# Patient Record
Sex: FEMALE | Race: BLACK OR AFRICAN AMERICAN | NOT HISPANIC OR LATINO | URBAN - NONMETROPOLITAN AREA
[De-identification: names, ages, dates, MRNs, and addresses within clinical notes are randomized per-mention and may not be internally consistent; named-entity substitution may affect disease eponyms.]

---

## 2019-09-05 ENCOUNTER — TELEPHONE (OUTPATIENT)
Dept: OBSTETRICS AND GYNECOLOGY | Facility: CLINIC | Age: 29
End: 2019-09-05

## 2019-09-05 NOTE — TELEPHONE ENCOUNTER
PT CALLS REGARDING WHEN SHE HAD NEXPLANON INSERTED.  TOLD HER IT WAS INSERTED IN November 2016.  SHE WANTED TO KNOW HOW LONG IT WAS GOOD FOR AND DR. PEPE SAID IT WOULD BE GOOD FOR  FIVE YEARS.  PT INFORMED.  BC

## 2021-02-08 PROCEDURE — 87635 SARS-COV-2 COVID-19 AMP PRB: CPT | Performed by: NURSE PRACTITIONER

## 2021-07-07 ENCOUNTER — OFFICE VISIT (OUTPATIENT)
Dept: FAMILY MEDICINE CLINIC | Facility: CLINIC | Age: 31
End: 2021-07-07

## 2021-07-07 VITALS
TEMPERATURE: 98.3 F | SYSTOLIC BLOOD PRESSURE: 120 MMHG | HEART RATE: 76 BPM | WEIGHT: 152.2 LBS | HEIGHT: 61 IN | DIASTOLIC BLOOD PRESSURE: 70 MMHG | BODY MASS INDEX: 28.74 KG/M2 | OXYGEN SATURATION: 99 %

## 2021-07-07 DIAGNOSIS — R20.2 NUMBNESS AND TINGLING OF BOTH FEET: ICD-10-CM

## 2021-07-07 DIAGNOSIS — Z86.2 HISTORY OF ANEMIA: ICD-10-CM

## 2021-07-07 DIAGNOSIS — R53.83 FATIGUE, UNSPECIFIED TYPE: ICD-10-CM

## 2021-07-07 DIAGNOSIS — R20.0 NUMBNESS AND TINGLING OF BOTH FEET: ICD-10-CM

## 2021-07-07 DIAGNOSIS — F43.23 ADJUSTMENT DISORDER WITH MIXED ANXIETY AND DEPRESSED MOOD: Primary | ICD-10-CM

## 2021-07-07 DIAGNOSIS — R25.2 MUSCLE CRAMP, NOCTURNAL: ICD-10-CM

## 2021-07-07 PROCEDURE — 99204 OFFICE O/P NEW MOD 45 MIN: CPT | Performed by: FAMILY MEDICINE

## 2021-07-07 NOTE — PROGRESS NOTES
Chief Complaint  Establish Care (TINA-7=12 , PHQ-9=8) - moderate anxiety, mild depressive features    Subjective          Kimberly Nassar presents to [facility] - New  Pt.    History of Present Illness      Nexplanon user - has seen Keke Bailey at OB/GYN ( insertion was in Dec 2016) and also remotely seen Christian Anguiano for acne issues.  Doing regular exercise  Gets about 6 hours of sleep at night  Takes about 1 hour to fall asleep due to racing thoughts.  Would like to have a check up and some labs due to fatigue  + h/o anemia.    Reports history of mood changes while pregnant and postpartum 8 year ago.    Has had past trials of Prozac which worsened her sx and made her more agitated and somewhat aggressive.  Had a trial of Zoloft during the pregnancy and stays on it again for 6-7 months after the pregnancy due to post partum depression.      She is a non smoker who drinks occasionally and lives at home with her kids.  She has a college education and is up to date on vaccines.   She is employed with [employer]. Parenting and working are taking a toll on her and the fatigue is leading to irritability and strained interactions at home.    Review of Systems   Constitutional: Positive for fatigue.   Genitourinary: Negative for decreased libido.        Reports normal Paps in the past  Is interested in screening for STDs   Psychiatric/Behavioral: Positive for decreased concentration, sleep disturbance and depressed mood. The patient is nervous/anxious.         Has lots of worrying and feels she can't control it, has irritability and feels anxious, minimal agitation or restlessness.     Mild trouble with concentration.  No SI or thoughts of self-harm.            Past Medical History:   Diagnosis Date   • Anxiety    • Depression          Outpatient Medications Prior to Visit   Medication Sig Dispense Refill   • Etonogestrel (IMPLANON) 68 MG implant subdermal implant Inject 1 each into  "the skin 1 (one) time.       No facility-administered medications prior to visit.          Objective   Vital Signs:   /70 (BP Location: Left arm, Patient Position: Sitting, Cuff Size: Adult)   Pulse 76   Temp 98.3 °F (36.8 °C) (Temporal)   Ht 154.9 cm (61\")   Wt 69 kg (152 lb 3.2 oz)   SpO2 99%   BMI 28.76 kg/m²       Physical Exam  Vitals reviewed.   Constitutional:       Comments: Stocky habitus throughout   HENT:      Mouth/Throat:      Mouth: Mucous membranes are moist.      Pharynx: Oropharynx is clear. No oropharyngeal exudate.   Eyes:      Extraocular Movements: Extraocular movements intact.      Conjunctiva/sclera: Conjunctivae normal.      Comments: Lids normal   Neck:      Comments: Normal thyroid exam  Cardiovascular:      Rate and Rhythm: Normal rate and regular rhythm.   Pulmonary:      Effort: Pulmonary effort is normal.      Breath sounds: Normal breath sounds.   Abdominal:      General: Bowel sounds are normal. There is no distension.      Palpations: Abdomen is soft.      Tenderness: There is no abdominal tenderness.   Musculoskeletal:      Cervical back: Neck supple. No tenderness.      Right lower leg: No edema.      Left lower leg: No edema.   Lymphadenopathy:      Cervical: No cervical adenopathy.   Skin:     General: Skin is warm.      Findings: No erythema.   Neurological:      Mental Status: She is alert and oriented to person, place, and time.      Motor: No tremor.      Coordination: Coordination is intact.      Gait: Gait normal.   Psychiatric:         Attention and Perception: Attention normal.         Mood and Affect: Affect normal. Mood is anxious and depressed.         Speech: Speech normal.         Behavior: Behavior normal. Behavior is cooperative.         Thought Content: Thought content normal.         Cognition and Memory: Cognition and memory normal.         Judgment: Judgment normal.          Result Review :                Component   Ref Range & Units 5 yr ago "   (8/20/15) 5 yr ago   (7/15/15) 6 yr ago   (5/14/15) 6 yr ago   (4/17/15)   WBC   4.80 - 10.80 K/mcL 4.10Low   5.60  5.23  3.58Low     RBC   4.20 - 5.40 M/mcL 3.69Low   3.72Low   3.63Low   3.86Low     Hemoglobin   12.0 - 16.0 g/dL 11.4Low   11.5Low   11.3Low   11.9Low     Hematocrit   37.0 - 47.0 % 33.3Low   34.5Low   33.4Low   35.8Low     MCV   82.0 - 98.0 fL 90.2  92.7  92.0  92.7    MCH   28.0 - 32.0 pg 30.9  30.9  31.1  30.8    MCHC   33.0 - 36.0 gm/dL 34.2  33.3  33.8  33.2    RDW-SD   40.0 - 54.0 fL 45.2  46.9  43.4  43.4    RDW-CV   12.0 - 15.0 % 13.6  13.8  12.9  12.9    RDW   12 - 15 % 13.6  13.8  12.9  12.9            All chol panels normal for past 5-6 years  Normal Pap in 2016                  Assessment and Plan    Diagnoses and all orders for this visit:    1. Adjustment disorder with mixed anxiety and depressed mood (Primary)  -     TSH; Future  -     T4, free; Future  -     Comprehensive metabolic panel; Future  -     sertraline (Zoloft) 50 MG tablet; Take 1 tablet by mouth daily with food for mood.  Dispense: 30 tablet; Refill: 2    2. History of anemia  -     Iron and TIBC; Future  -     Vitamin B12; Future  -     CBC No Differential; Future    3. Numbness and tingling of both feet  -     TSH; Future  -     T4, free; Future  -     Vitamin B12; Future    4. Muscle cramp, nocturnal  -     TSH; Future  -     T4, free; Future  -     Comprehensive metabolic panel; Future  -     Vitamin D 25 hydroxy; Future  -     Magnesium; Future    5. Fatigue, unspecified type  -     TSH; Future  -     T4, free; Future  -     Comprehensive metabolic panel; Future  -     Iron and TIBC; Future  -     Vitamin B12; Future  -     Vitamin D 25 hydroxy; Future  -     Magnesium; Future  -     CBC No Differential; Future            Follow Up   Return in about 4 weeks (around 8/4/2021) for Recheck mood, lab results, Zoloft.    Patient was given instructions and counseling regarding her condition or for health maintenance  advice.     Please see specific information/handouts pulled into the AVS if appropriate.             Yamini Jernigan M.D.  [facility]

## 2021-07-13 ENCOUNTER — LAB (OUTPATIENT)
Dept: LAB | Facility: HOSPITAL | Age: 31
End: 2021-07-13

## 2021-07-13 DIAGNOSIS — R25.2 MUSCLE CRAMP, NOCTURNAL: ICD-10-CM

## 2021-07-13 DIAGNOSIS — R20.0 NUMBNESS AND TINGLING OF BOTH FEET: ICD-10-CM

## 2021-07-13 DIAGNOSIS — Z86.2 HISTORY OF ANEMIA: ICD-10-CM

## 2021-07-13 DIAGNOSIS — R53.83 FATIGUE, UNSPECIFIED TYPE: ICD-10-CM

## 2021-07-13 DIAGNOSIS — R20.2 NUMBNESS AND TINGLING OF BOTH FEET: ICD-10-CM

## 2021-07-13 DIAGNOSIS — F43.23 ADJUSTMENT DISORDER WITH MIXED ANXIETY AND DEPRESSED MOOD: ICD-10-CM

## 2021-07-13 LAB
25(OH)D3 SERPL-MCNC: 11.9 NG/ML (ref 30–100)
ALBUMIN SERPL-MCNC: 4.4 G/DL (ref 3.5–5.2)
ALBUMIN/GLOB SERPL: 1.8 G/DL
ALP SERPL-CCNC: 58 U/L (ref 39–117)
ALT SERPL W P-5'-P-CCNC: 10 U/L (ref 1–33)
ANION GAP SERPL CALCULATED.3IONS-SCNC: 6.8 MMOL/L (ref 5–15)
AST SERPL-CCNC: 16 U/L (ref 1–32)
BILIRUB SERPL-MCNC: 0.3 MG/DL (ref 0–1.2)
BUN SERPL-MCNC: 12 MG/DL (ref 6–20)
BUN/CREAT SERPL: 15.6 (ref 7–25)
CALCIUM SPEC-SCNC: 9.2 MG/DL (ref 8.6–10.5)
CHLORIDE SERPL-SCNC: 108 MMOL/L (ref 98–107)
CO2 SERPL-SCNC: 23.2 MMOL/L (ref 22–29)
CREAT SERPL-MCNC: 0.77 MG/DL (ref 0.57–1)
DEPRECATED RDW RBC AUTO: 43 FL (ref 37–54)
ERYTHROCYTE [DISTWIDTH] IN BLOOD BY AUTOMATED COUNT: 12.4 % (ref 12.3–15.4)
GFR SERPL CREATININE-BSD FRML MDRD: 105 ML/MIN/1.73
GLOBULIN UR ELPH-MCNC: 2.5 GM/DL
GLUCOSE SERPL-MCNC: 97 MG/DL (ref 65–99)
HCT VFR BLD AUTO: 35.7 % (ref 34–46.6)
HGB BLD-MCNC: 11.5 G/DL (ref 12–15.9)
IRON 24H UR-MRATE: 44 MCG/DL (ref 37–145)
IRON SATN MFR SERPL: 10 % (ref 20–50)
MAGNESIUM SERPL-MCNC: 1.9 MG/DL (ref 1.6–2.6)
MCH RBC QN AUTO: 30.7 PG (ref 26.6–33)
MCHC RBC AUTO-ENTMCNC: 32.2 G/DL (ref 31.5–35.7)
MCV RBC AUTO: 95.2 FL (ref 79–97)
PLATELET # BLD AUTO: 330 10*3/MM3 (ref 140–450)
PMV BLD AUTO: 10.2 FL (ref 6–12)
POTASSIUM SERPL-SCNC: 4.1 MMOL/L (ref 3.5–5.2)
PROT SERPL-MCNC: 6.9 G/DL (ref 6–8.5)
RBC # BLD AUTO: 3.75 10*6/MM3 (ref 3.77–5.28)
SODIUM SERPL-SCNC: 138 MMOL/L (ref 136–145)
T4 FREE SERPL-MCNC: 1.05 NG/DL (ref 0.93–1.7)
TIBC SERPL-MCNC: 454 MCG/DL (ref 298–536)
TRANSFERRIN SERPL-MCNC: 305 MG/DL (ref 200–360)
TSH SERPL DL<=0.05 MIU/L-ACNC: 0.93 UIU/ML (ref 0.27–4.2)
VIT B12 BLD-MCNC: 385 PG/ML (ref 211–946)
WBC # BLD AUTO: 4.09 10*3/MM3 (ref 3.4–10.8)

## 2021-07-13 PROCEDURE — 83540 ASSAY OF IRON: CPT

## 2021-07-13 PROCEDURE — 83735 ASSAY OF MAGNESIUM: CPT

## 2021-07-13 PROCEDURE — 36415 COLL VENOUS BLD VENIPUNCTURE: CPT

## 2021-07-13 PROCEDURE — 84443 ASSAY THYROID STIM HORMONE: CPT

## 2021-07-13 PROCEDURE — 82306 VITAMIN D 25 HYDROXY: CPT

## 2021-07-13 PROCEDURE — 85027 COMPLETE CBC AUTOMATED: CPT

## 2021-07-13 PROCEDURE — 84439 ASSAY OF FREE THYROXINE: CPT

## 2021-07-13 PROCEDURE — 80053 COMPREHEN METABOLIC PANEL: CPT

## 2021-07-13 PROCEDURE — 82607 VITAMIN B-12: CPT

## 2021-07-13 PROCEDURE — 84466 ASSAY OF TRANSFERRIN: CPT

## 2021-07-19 DIAGNOSIS — E55.9 VITAMIN D DEFICIENCY: Primary | ICD-10-CM

## 2021-07-19 RX ORDER — ERGOCALCIFEROL 1.25 MG/1
50000 CAPSULE ORAL WEEKLY
Qty: 5 CAPSULE | Refills: 5 | Status: SHIPPED | OUTPATIENT
Start: 2021-07-19 | End: 2022-05-16

## 2021-08-04 ENCOUNTER — OFFICE VISIT (OUTPATIENT)
Dept: FAMILY MEDICINE CLINIC | Facility: CLINIC | Age: 31
End: 2021-08-04

## 2021-08-04 VITALS
BODY MASS INDEX: 28.02 KG/M2 | OXYGEN SATURATION: 99 % | HEART RATE: 82 BPM | WEIGHT: 148.4 LBS | TEMPERATURE: 98.5 F | SYSTOLIC BLOOD PRESSURE: 130 MMHG | HEIGHT: 61 IN | DIASTOLIC BLOOD PRESSURE: 80 MMHG

## 2021-08-04 DIAGNOSIS — R20.2 NUMBNESS AND TINGLING OF BOTH FEET: ICD-10-CM

## 2021-08-04 DIAGNOSIS — F43.23 ADJUSTMENT DISORDER WITH MIXED ANXIETY AND DEPRESSED MOOD: Primary | ICD-10-CM

## 2021-08-04 DIAGNOSIS — E53.8 LOW VITAMIN B12 LEVEL: ICD-10-CM

## 2021-08-04 DIAGNOSIS — R53.83 FATIGUE, UNSPECIFIED TYPE: ICD-10-CM

## 2021-08-04 DIAGNOSIS — R20.0 NUMBNESS AND TINGLING OF BOTH FEET: ICD-10-CM

## 2021-08-04 DIAGNOSIS — E55.9 VITAMIN D DEFICIENCY: ICD-10-CM

## 2021-08-04 DIAGNOSIS — R25.2 MUSCLE CRAMP, NOCTURNAL: ICD-10-CM

## 2021-08-04 PROCEDURE — 99214 OFFICE O/P EST MOD 30 MIN: CPT | Performed by: FAMILY MEDICINE

## 2021-08-04 PROCEDURE — 96372 THER/PROPH/DIAG INJ SC/IM: CPT | Performed by: FAMILY MEDICINE

## 2021-08-04 RX ORDER — CYANOCOBALAMIN 1000 UG/ML
1000 INJECTION, SOLUTION INTRAMUSCULAR; SUBCUTANEOUS
Status: SHIPPED | OUTPATIENT
Start: 2021-08-04

## 2021-08-04 RX ADMIN — CYANOCOBALAMIN 1000 MCG: 1000 INJECTION, SOLUTION INTRAMUSCULAR; SUBCUTANEOUS at 15:24

## 2021-08-04 NOTE — PROGRESS NOTES
Chief Complaint  Anxiety and Depression    Subjective          Kimberly Nassar presents to [facility] - Established Pt.    History of Present Illness  31 yo woman here for recheck of mood and review of lab results.    Thyroid testing is normal.  Anemia is improved from the past and very slight.  There is a slight need for more iron, recommend more leafy greens in the diet, sugar looks great.  Kidney and liver function tests are normal.  The vitamin D level is low and adding a supplement would help muscles and bones.  Magnesium level is good.  The vitamin B12 level could be higher.  Adding a supplement would improve overall energy and concentration.    Got Rx for high dose Vit D once a week on 7/19/21    Not ambriz, more on an even keel   She is yawning a lot more; wonders if it's her Zoloft  Falling asleep easier, takes less time to fall asleep  She is getting in 6-7 hours of sleep  She is walking at work several days a week.  No nausea and no diarrhea and no HA.    Mood feels like it did when she was on Zoloft before.    She is having some nocturnal muscle cramps.  Some tingling/numbness in her feet.    Had COVID Feb 2nd of this year. Still has some fatigue related to that.        Past Medical History:   Diagnosis Date   • Anxiety    • Depression          Outpatient Medications Prior to Visit   Medication Sig Dispense Refill   • Etonogestrel (IMPLANON) 68 MG implant subdermal implant Inject 1 each into the skin 1 (one) time.     • sertraline (Zoloft) 50 MG tablet Take 1 tablet by mouth daily with food for mood. 30 tablet 2   • vitamin D (ERGOCALCIFEROL) 1.25 MG (81069 UT) capsule capsule Take 1 capsule by mouth 1 (One) time per week with food for vitamin D deficiency 5 capsule 5     No facility-administered medications prior to visit.          Objective   Vital Signs:   /80 (BP Location: Left arm, Patient Position: Sitting, Cuff Size: Adult)   Pulse 82   Temp 98.5 °F (36.9  "°C) (Temporal)   Ht 154.9 cm (61\")   Wt 67.3 kg (148 lb 6.4 oz)   SpO2 99%   BMI 28.04 kg/m²       Physical Exam  Vitals reviewed.   Constitutional:       Comments: Overweight WD, WN Woman, ambulatory and grossly MAEW.  No obvious muscle cramping now.   HENT:      Mouth/Throat:      Mouth: Mucous membranes are moist.      Pharynx: Oropharynx is clear. No oropharyngeal exudate or posterior oropharyngeal erythema.   Eyes:      Conjunctiva/sclera: Conjunctivae normal.   Cardiovascular:      Rate and Rhythm: Normal rate and regular rhythm.   Pulmonary:      Effort: Pulmonary effort is normal.      Breath sounds: Normal breath sounds.   Abdominal:      General: Bowel sounds are normal.      Palpations: Abdomen is soft.   Skin:     General: Skin is warm.      Findings: No erythema.   Neurological:      Mental Status: She is alert and oriented to person, place, and time.          Result Review :   The following data was reviewed by: Yamini Jernigan MD on 08/04/2021:    CBC No Differential (07/13/2021 09:18)  Magnesium (07/13/2021 09:18)  Vitamin D 25 hydroxy (07/13/2021 09:18)  Vitamin B12 (07/13/2021 09:18)  Iron and TIBC (07/13/2021 09:18)  Comprehensive metabolic panel (07/13/2021 09:18)  T4, free (07/13/2021 09:18)  TSH (07/13/2021 09:18)                    Assessment and Plan    Diagnoses and all orders for this visit:    1. Adjustment disorder with mixed anxiety and depressed mood (Primary)  Comments:  Got #30 of the Zoloft 50 mg daily on 7/7/21 with 2 refills.  Will continue that.    2. Vitamin D deficiency  Comments:  Continue high dose Vit D once a week, has 5 refills on her current rx    3. Fatigue, unspecified type  -     cyanocobalamin injection 1,000 mcg    4. Muscle cramp, nocturnal    5. Numbness and tingling of both feet  -     cyanocobalamin injection 1,000 mcg    6. Low vitamin B12 level  -     cyanocobalamin injection 1,000 mcg            Follow Up   Return in about 6 months (around 1/25/2022) for " recheck Zoloft and Vit D rx.    Patient was given instructions and counseling regarding her condition or for health maintenance advice.     Please see specific information/handouts pulled into the AVS if appropriate.             Yamini Jernigan M.D.  [facility]

## 2021-10-10 PROCEDURE — 87635 SARS-COV-2 COVID-19 AMP PRB: CPT | Performed by: FAMILY MEDICINE

## 2021-10-19 DIAGNOSIS — F43.23 ADJUSTMENT DISORDER WITH MIXED ANXIETY AND DEPRESSED MOOD: ICD-10-CM

## 2021-11-17 ENCOUNTER — OFFICE VISIT (OUTPATIENT)
Dept: OBSTETRICS AND GYNECOLOGY | Facility: CLINIC | Age: 31
End: 2021-11-17

## 2021-11-17 VITALS
SYSTOLIC BLOOD PRESSURE: 136 MMHG | HEIGHT: 61 IN | DIASTOLIC BLOOD PRESSURE: 88 MMHG | BODY MASS INDEX: 27.38 KG/M2 | WEIGHT: 145 LBS

## 2021-11-17 DIAGNOSIS — Z01.419 ENCOUNTER FOR GYNECOLOGICAL EXAMINATION WITHOUT ABNORMAL FINDING: Primary | ICD-10-CM

## 2021-11-17 DIAGNOSIS — Z97.5 NEXPLANON IN PLACE: ICD-10-CM

## 2021-11-17 DIAGNOSIS — Z78.9 NON-SMOKER: ICD-10-CM

## 2021-11-17 DIAGNOSIS — E66.3 OVERWEIGHT WITH BODY MASS INDEX (BMI) OF 27 TO 27.9 IN ADULT: ICD-10-CM

## 2021-11-17 PROCEDURE — 99395 PREV VISIT EST AGE 18-39: CPT | Performed by: NURSE PRACTITIONER

## 2021-11-17 PROCEDURE — 87624 HPV HI-RISK TYP POOLED RSLT: CPT | Performed by: NURSE PRACTITIONER

## 2021-11-17 PROCEDURE — G0123 SCREEN CERV/VAG THIN LAYER: HCPCS | Performed by: NURSE PRACTITIONER

## 2021-11-17 NOTE — PATIENT INSTRUCTIONS
"BMI for Adults  What is BMI?  Body mass index (BMI) is a number that is calculated from a person's weight and height. BMI can help estimate how much of a person's weight is composed of fat. BMI does not measure body fat directly. Rather, it is an alternative to procedures that directly measure body fat, which can be difficult and expensive.  BMI can help identify people who may be at higher risk for certain medical problems.  What are BMI measurements used for?  BMI is used as a screening tool to identify possible weight problems. It helps determine whether a person is obese, overweight, a healthy weight, or underweight.  BMI is useful for:  · Identifying a weight problem that may be related to a medical condition or may increase the risk for medical problems.  · Promoting changes, such as changes in diet and exercise, to help reach a healthy weight. BMI screening can be repeated to see if these changes are working.  How is BMI calculated?  BMI involves measuring your weight in relation to your height. Both height and weight are measured, and the BMI is calculated from those numbers. This can be done either in English (U[place] or metric measurements. Note that charts and online BMI calculators are available to help you find your BMI quickly and easily without having to do these calculations yourself.  To calculate your BMI in English (U.S[place] measurements:    1. Measure your weight in pounds (lb).  2. Multiply the number of pounds by 703.  ? For example, for a person who weighs 180 lb, multiply that number by 703, which equals 126,540.  3. Measure your height in inches. Then multiply that number by itself to get a measurement called \"inches squared.\"  ? For example, for a person who is 70 inches tall, the \"inches squared\" measurement is 70 inches x 70 inches, which equals 4,900 inches squared.  4. Divide the total from step 2 (number of lb x 703) by the total from step 3 (inches squared): 126,540 ÷ 4,900 = 25.8. This is " "your BMI.    To calculate your BMI in metric measurements:  1. Measure your weight in kilograms (kg).  2. Measure your height in meters (m). Then multiply that number by itself to get a measurement called \"meters squared.\"  ? For example, for a person who is 1.75 m tall, the \"meters squared\" measurement is 1.75 m x 1.75 m, which is equal to 3.1 meters squared.  3. Divide the number of kilograms (your weight) by the meters squared number. In this example: 70 ÷ 3.1 = 22.6. This is your BMI.  What do the results mean?  BMI charts are used to identify whether you are underweight, normal weight, overweight, or obese. The following guidelines will be used:  · Underweight: BMI less than 18.5.  · Normal weight: BMI between 18.5 and 24.9.  · Overweight: BMI between 25 and 29.9.  · Obese: BMI of 30 or above.  Keep these notes in mind:  · Weight includes both fat and muscle, so someone with a muscular build, such as an athlete, may have a BMI that is higher than 24.9. In cases like these, BMI is not an accurate measure of body fat.  · To determine if excess body fat is the cause of a BMI of 25 or higher, further assessments may need to be done by a health care provider.  · BMI is usually interpreted in the same way for men and women.  Where to find more information  For more information about BMI, including tools to quickly calculate your BMI, go to these websites:  · Centers for Disease Control and Prevention: www.cdc.gov  · American Heart Association: www.heart.org  · National Heart, Lung, and Blood [place]: www.nhlbi.nih.gov  Summary  · Body mass index (BMI) is a number that is calculated from a person's weight and height.  · BMI may help estimate how much of a person's weight is composed of fat. BMI can help identify those who may be at higher risk for certain medical problems.  · BMI can be measured using English measurements or metric measurements.  · BMI charts are used to identify whether you are underweight, normal " weight, overweight, or obese.  This information is not intended to replace advice given to you by your health care provider. Make sure you discuss any questions you have with your health care provider.  Document Revised: 09/09/2020 Document Reviewed: 07/17/2020  Elsevier Patient Education © 2021 [employer]

## 2021-11-17 NOTE — PROGRESS NOTES
"Paz Nassar is a 31 y.o. female.     Annual exam      The following portions of the patient's history were reviewed and updated as appropriate: allergies, current medications, past family history, past medical history, past social history, past surgical history and problem list.    /88   Ht 154.9 cm (61\")   Wt 65.8 kg (145 lb)   LMP 11/04/2021   BMI 27.40 kg/m²     Review of Systems   Constitutional: Negative for activity change, appetite change, fatigue and fever.   HENT: Negative for congestion, sore throat and trouble swallowing.    Eyes: Negative for pain, discharge and visual disturbance.   Respiratory: Negative for apnea, shortness of breath and wheezing.    Cardiovascular: Negative for chest pain, palpitations and leg swelling.   Gastrointestinal: Negative for abdominal pain, constipation and diarrhea.   Genitourinary: Negative for frequency, pelvic pain, urgency and vaginal discharge.        Last 2 yrs heavier bleeding  4-5 days   Mild cramping   Musculoskeletal: Negative for back pain and gait problem.   Skin: Negative for color change and rash.   Neurological: Negative for dizziness, weakness and numbness.   Psychiatric/Behavioral: Negative for confusion and sleep disturbance.       Objective   Physical Exam  Vitals and nursing note reviewed. Exam conducted with a chaperone present.   Constitutional:       General: She is not in acute distress.     Appearance: She is well-developed. She is not diaphoretic.   HENT:      Head: Normocephalic.      Right Ear: External ear normal.      Left Ear: External ear normal.      Nose: Nose normal.   Eyes:      General: No scleral icterus.        Right eye: No discharge.         Left eye: No discharge.      Conjunctiva/sclera: Conjunctivae normal.      Pupils: Pupils are equal, round, and reactive to light.   Neck:      Thyroid: No thyromegaly.      Vascular: No carotid bruit.      Trachea: No tracheal deviation.   Cardiovascular:      Rate " and Rhythm: Normal rate and regular rhythm.      Heart sounds: Normal heart sounds. No murmur heard.      Pulmonary:      Effort: Pulmonary effort is normal. No respiratory distress.      Breath sounds: Normal breath sounds. No wheezing.   Chest:   Breasts: Breasts are symmetrical.      Right: Normal. No swelling, bleeding, inverted nipple, mass, nipple discharge, skin change, tenderness, axillary adenopathy or supraclavicular adenopathy.      Left: Normal. No swelling, bleeding, inverted nipple, mass, nipple discharge, skin change, tenderness, axillary adenopathy or supraclavicular adenopathy.       Abdominal:      General: There is no distension.      Palpations: Abdomen is soft. There is no mass.      Tenderness: There is no abdominal tenderness. There is no right CVA tenderness, left CVA tenderness or guarding.      Hernia: No hernia is present. There is no hernia in the left inguinal area or right inguinal area.   Genitourinary:     General: Normal vulva.      Exam position: Lithotomy position.      Labia:         Right: No rash, tenderness, lesion or injury.         Left: No rash, tenderness, lesion or injury.       Vagina: No signs of injury and foreign body. Vaginal discharge present. No erythema, tenderness or bleeding.      Cervix: Normal.      Uterus: Normal. Not enlarged, not fixed and not tender.       Adnexa: Right adnexa normal and left adnexa normal.        Right: No mass, tenderness or fullness.          Left: No mass, tenderness or fullness.        Rectum: Normal. No mass.      Comments:   BSU normal  Urethral meatus  Normal  Perineum  Normal  Musculoskeletal:         General: No tenderness. Normal range of motion.      Cervical back: Normal range of motion and neck supple.   Lymphadenopathy:      Head:      Right side of head: No submental, submandibular, tonsillar, preauricular, posterior auricular or occipital adenopathy.      Left side of head: No submental, submandibular, tonsillar,  preauricular, posterior auricular or occipital adenopathy.      Cervical: No cervical adenopathy.      Right cervical: No superficial, deep or posterior cervical adenopathy.     Left cervical: No superficial, deep or posterior cervical adenopathy.      Upper Body:      Right upper body: No supraclavicular, axillary or pectoral adenopathy.      Left upper body: No supraclavicular, axillary or pectoral adenopathy.      Lower Body: No right inguinal adenopathy. No left inguinal adenopathy.   Skin:     General: Skin is warm and dry.      Findings: No bruising, erythema or rash.   Neurological:      Mental Status: She is alert and oriented to person, place, and time.      Coordination: Coordination normal.   Psychiatric:         Mood and Affect: Mood normal.         Behavior: Behavior normal.         Thought Content: Thought content normal.         Judgment: Judgment normal.         Assessment/Plan   Well woman GYN exam.   Pap smear done per ASCCP guidelines.   Will have lab work at PCP.     Encouraged SBE, pt is aware how to do self breast exam and the importance of same.   Discussed weight management and importance of maintaining a healthy weight.   Discussed Vitamin D intake and the importance of adequate vitamin D for both Bone Health and a healthy immune system.    Discussed Daily exercise and the importance of same, in regards to a healthy heart as well as helping to maintain her weight and improving her mental health.     Discussed STD prevention and testing.   Pt declines STD testing.    Discussed BC options, risks and benefits. Pt opts to have Nexplanon removed and placed. Discussed ordering process, removal, placement and follow up for Nexplanon. Pt voiced understanding.   Nexplanon form signed and submitted.    Patient's Body mass index is 27.4 kg/m². indicating that she is overweight (BMI 25-29.9). Obesity-related health conditions include the following: none. Obesity is improving with lifestyle  modifications. BMI is is above average; no BMI management plan is appropriate. We discussed portion control and increasing exercise..    RV annual exam/prn.   Diagnoses and all orders for this visit:    1. Encounter for gynecological examination without abnormal finding (Primary)  -     Liquid-based Pap Smear, Screening    2. Overweight with body mass index (BMI) of 27 to 27.9 in adult    3. Non-smoker    4. Nexplanon in place

## 2021-11-19 LAB
GEN CATEG CVX/VAG CYTO-IMP: NORMAL
HPV I/H RISK 4 DNA CVX QL PROBE+SIG AMP: NOT DETECTED
LAB AP CASE REPORT: NORMAL
LAB AP GYN ADDITIONAL INFORMATION: NORMAL
LAB AP GYN OTHER FINDINGS: NORMAL
PATH INTERP SPEC-IMP: NORMAL
STAT OF ADQ CVX/VAG CYTO-IMP: NORMAL

## 2021-11-24 ENCOUNTER — TELEPHONE (OUTPATIENT)
Dept: OBSTETRICS AND GYNECOLOGY | Facility: CLINIC | Age: 31
End: 2021-11-24

## 2021-11-24 RX ORDER — METRONIDAZOLE 500 MG/1
500 TABLET ORAL 2 TIMES DAILY
Qty: 14 TABLET | Refills: 0 | Status: SHIPPED | OUTPATIENT
Start: 2021-11-24 | End: 2021-12-07

## 2021-11-24 NOTE — TELEPHONE ENCOUNTER
----- Message from BHARATH Angulo sent at 11/23/2021  6:46 PM CST -----  Pap and HPV negative.     Pap note indicates possible BV. If pt is having symptoms (vaginal discharge, irritation and/or odor) then we can either send in Flagyl 500 mg PO BID x 7 days  OR the current specimen can be sent for a BV panel.     [employer] message sent.

## 2022-01-31 ENCOUNTER — PROCEDURE VISIT (OUTPATIENT)
Dept: OBSTETRICS AND GYNECOLOGY | Facility: CLINIC | Age: 32
End: 2022-01-31

## 2022-01-31 VITALS
DIASTOLIC BLOOD PRESSURE: 74 MMHG | SYSTOLIC BLOOD PRESSURE: 130 MMHG | WEIGHT: 146 LBS | HEIGHT: 61 IN | BODY MASS INDEX: 27.56 KG/M2

## 2022-01-31 DIAGNOSIS — Z30.017 NEXPLANON INSERTION: ICD-10-CM

## 2022-01-31 DIAGNOSIS — Z30.46 NEXPLANON REMOVAL: Primary | ICD-10-CM

## 2022-01-31 PROCEDURE — 11983 REMOVE/INSERT DRUG IMPLANT: CPT | Performed by: NURSE PRACTITIONER

## 2022-01-31 PROCEDURE — 81025 URINE PREGNANCY TEST: CPT | Performed by: NURSE PRACTITIONER

## 2022-01-31 RX ORDER — ETONOGESTREL 68 MG/1
1 IMPLANT SUBCUTANEOUS ONCE
COMMUNITY

## 2022-02-01 LAB
B-HCG UR QL: NEGATIVE
EXPIRATION DATE: NORMAL
INTERNAL NEGATIVE CONTROL: NEGATIVE
INTERNAL POSITIVE CONTROL: POSITIVE
Lab: NORMAL

## 2022-02-09 ENCOUNTER — OFFICE VISIT (OUTPATIENT)
Dept: FAMILY MEDICINE CLINIC | Facility: CLINIC | Age: 32
End: 2022-02-09

## 2022-02-09 VITALS
TEMPERATURE: 97.5 F | OXYGEN SATURATION: 98 % | HEART RATE: 86 BPM | BODY MASS INDEX: 26.77 KG/M2 | SYSTOLIC BLOOD PRESSURE: 120 MMHG | DIASTOLIC BLOOD PRESSURE: 80 MMHG | HEIGHT: 61 IN | WEIGHT: 141.8 LBS

## 2022-02-09 DIAGNOSIS — R23.2 HOT FLASHES: ICD-10-CM

## 2022-02-09 DIAGNOSIS — E53.8 LOW VITAMIN B12 LEVEL: ICD-10-CM

## 2022-02-09 DIAGNOSIS — E55.9 VITAMIN D DEFICIENCY: ICD-10-CM

## 2022-02-09 DIAGNOSIS — Z13.220 SCREENING CHOLESTEROL LEVEL: ICD-10-CM

## 2022-02-09 DIAGNOSIS — Z86.2 HISTORY OF ANEMIA: ICD-10-CM

## 2022-02-09 DIAGNOSIS — F43.23 ADJUSTMENT DISORDER WITH MIXED ANXIETY AND DEPRESSED MOOD: Primary | ICD-10-CM

## 2022-02-09 DIAGNOSIS — D50.9 IRON DEFICIENCY ANEMIA, UNSPECIFIED IRON DEFICIENCY ANEMIA TYPE: ICD-10-CM

## 2022-02-09 PROCEDURE — 99214 OFFICE O/P EST MOD 30 MIN: CPT | Performed by: FAMILY MEDICINE

## 2022-02-09 RX ORDER — ESCITALOPRAM OXALATE 5 MG/1
5 TABLET ORAL DAILY
Qty: 30 TABLET | Refills: 2 | Status: SHIPPED | OUTPATIENT
Start: 2022-02-09 | End: 2022-03-09

## 2022-02-09 NOTE — PROGRESS NOTES
Chief Complaint  Follow-up on mood, vitamin deficiencies    Subjective        History of Present Illness  Kimberly Nassar is a 31 y.o. female who presents to [facility] - established patient.    The patient is here to follow up on anxiety and depression, vitamin D deficiency, and iron deficiency. She was last here in 08/2021. She has had labs in the last year. She had COVID-19 about a year ago. She has been on Zoloft, which has been well-tolerated and effective for her mood and taking a high dose vitamin D pill once a week. Her blood pressure is good and she has lost 5 pounds in the last month.    She continues to  take Zoloft at 10 AM daily.     The patient states that she has intermittent night sweats but she is not sure if it is related to her medication. It does wake her up at night.     The patient reports that she is tolerating the Nexplanon well. She states that she has healed well after recent insertion. She has a follow up appointment with GYN in approximately 1 week.     She denies any vaginal drainage, problems or breast concerns. She denies any problems with bowel movements or urination.     The patient states that she is tolerating vitamin D without any side effects.    The patient denies any residual side effects of COVID-19.     The patient denies taking a multivitamin, iron supplement or vitamin B12.    The patient denies any muscle cramps, or numbness and tingling in her feet.    Result Review :   The following data was reviewed by: Yamini Jernigan MD on 02/09/2022:      Component   Ref Range & Units 6 yr ago   (8/20/15) 6 yr ago   (7/15/15) 6 yr ago   (5/14/15) 6 yr ago   (4/17/15)   Total Cholesterol   130 - 200 mg/dL 168  171  172  186    HDL Cholesterol   mg/dL 47  56 CM  56 CM  60 CM    Comment: DF by IF @ 08/20/2015 12:59   HDL Reference Range   Female: >50  Male: >40    Triglycerides   0 - 149 mg/dL 61  33  39  40    VLDL Cholesterol   6 - 32 mg/dL 12  7   "8  8    LDL Cholesterol    0 - 99 mg/dL 95  95  87  98    LDL/HDL Ratio   0.0 - 3.4 2.0  1.7  1.6  1.6    Fasting?  YES  YES  YES  YES    Resulting Agency  [facility]                     Review of Systems   Constitutional: Positive for unexpected weight loss.   Gastrointestinal: Negative for constipation and diarrhea.   Endocrine:        Nocturnal hot flashes   Genitourinary: Negative for difficulty urinating and vaginal pain.   Neurological: Negative for headache.   Psychiatric/Behavioral: Positive for sleep disturbance.        Past Medical History:   Diagnosis Date   • Anxiety    • Depression    • Multiple gestation        Outpatient Medications Prior to Visit   Medication Sig Dispense Refill   • Etonogestrel (Nexplanon) 68 MG implant subdermal implant Inject 1 each into the appropriate area of the skin as directed by provider 1 (One) Time. Inserted 1/31/2022     • vitamin D (ERGOCALCIFEROL) 1.25 MG (45988 UT) capsule capsule Take 1 capsule by mouth 1 (One) time per week with food for vitamin D deficiency 5 capsule 5   • sertraline (Zoloft) 50 MG tablet Take 1 tablet by mouth daily with food for mood. 30 tablet 2   • Etonogestrel (Implanon) 68 MG implant subdermal implant Inject 1 each into the appropriate area of the skin as directed by provider 1 (One) Time.       Facility-Administered Medications Prior to Visit   Medication Dose Route Frequency Provider Last Rate Last Admin   • cyanocobalamin injection 1,000 mcg  1,000 mcg Intramuscular Q28 Days Yamini Jernigan MD   1,000 mcg at 08/04/21 1524        Objective   Vital Signs:   /80 (BP Location: Left arm, Patient Position: Sitting, Cuff Size: Adult)   Pulse 86   Temp 97.5 °F (36.4 °C) (Temporal)   Ht 154.9 cm (61\")   Wt 64.3 kg (141 lb 12.8 oz)   SpO2 98%   BMI 26.79 kg/m²       Physical Exam  Constitutional:       General: She is not in acute distress.     Appearance: Normal appearance. She is not diaphoretic.      " Comments: Physically fit woman in NAD, relaxed manner, well-adjusted   HENT:      Nose: No congestion.   Eyes:      Extraocular Movements: Extraocular movements intact.      Conjunctiva/sclera: Conjunctivae normal.   Neck:      Comments: Normal thyroid exam  Cardiovascular:      Rate and Rhythm: Normal rate and regular rhythm.   Pulmonary:      Effort: Pulmonary effort is normal.      Breath sounds: Normal breath sounds.   Abdominal:      General: There is no distension.   Musculoskeletal:      Right lower leg: No edema.      Left lower leg: No edema.      Comments: Calves supple and NTTP   Skin:     General: Skin is warm.      Coloration: Skin is not pale.   Neurological:      Mental Status: She is alert and oriented to person, place, and time.      Coordination: Coordination normal.      Gait: Gait normal.      Comments: Not sleepy   Psychiatric:         Mood and Affect: Mood normal.         Speech: Speech normal.         Behavior: Behavior normal. Behavior is cooperative.         Thought Content: Thought content normal.         Cognition and Memory: Cognition and memory normal.         Judgment: Judgment normal.                 Assessment and Plan    Diagnoses and all orders for this visit:    1. Adjustment disorder with mixed anxiety and depressed mood (Primary)  -     escitalopram (Lexapro) 5 MG tablet; Take 1 tablet by mouth Daily. For mood  Dispense: 30 tablet; Refill: 2    2. Vitamin D deficiency  -     Comprehensive Metabolic Panel; Future  -     Vitamin D 25 Hydroxy; Future    3. Low vitamin B12 level  -     CBC (No Diff); Future  -     Vitamin B12; Future    4. Iron deficiency anemia, unspecified iron deficiency anemia type  -     Iron Profile; Future  -     CBC (No Diff); Future    5. Hot flashes  -     Comprehensive Metabolic Panel; Future  -     TSH; Future    6. History of anemia  -     Comprehensive Metabolic Panel; Future  -     CBC (No Diff); Future  -     Vitamin B12; Future    7. Screening  cholesterol level  -     Lipid Panel; Future    She will change right over from Zoloft in the morning to Lexapro in the morning. I think 5 mg dose should be sufficient, but if not, we can increase to 10 mg later in the spring. She will monitor hot flashes and mood as she goes over the next 4 to 6 weeks. We may adjust the vitamin D to a daily if her levels are indicating changes needed. She will continue the Nexplanon and the plan for GYN follow up.  We may consider some additional B12 if her levels are still on the low side.     [facility] and any further recommendations. We will plan a follow up here in 6 months to recheck any labs that need to be redone and to see how the Zoloft switched to Lexapro has gone and she will contact me sooner if she needs to. If she is doing well at the next visit in 08/2022, we will plan to have her follow up just once a year for medication check and recheck of labs as necessary. If her cholesterol panel is good, she could repeat it just once every 5 years unless she has significant changes in weight, diet or exercise. She is planning to increase her exercise which I think should help with her body mass index. It is at 26, but she does not appear overweight in her habitus at all and in fact is physically fit.        Follow Up   Return in about 6 months (around 8/9/2022) for recheck lexapro.    Patient was given instructions and counseling regarding her condition or for health maintenance advice.       Patient's Body mass index is 26.79 kg/m². indicating that she is overweight (BMI 25-29.9). Patient's (Body mass index is 26.79 kg/m².) indicates that they are overweight with health conditions that include none . Weight is improving with lifestyle modifications. BMI is now down to 26 as opposed to 27 a month ago.. We discussed increasing exercise. .    Please see specific information/handouts pulled into the AVS if appropriate.       Yamini Jernigan,  M.D.  [facility]   Family Medicine    Transcribed from ambient dictation for Yamini Jernigan MD by Puja Thompson.  02/09/22   15:01 CST    Patient verbalized consent to the visit recording.

## 2022-02-15 ENCOUNTER — TELEPHONE (OUTPATIENT)
Dept: OBSTETRICS AND GYNECOLOGY | Facility: CLINIC | Age: 32
End: 2022-02-15

## 2022-02-15 NOTE — TELEPHONE ENCOUNTER
Pt did not realize she had an appointment today until after appointment time.  Please call patient and schedule a Nexplanon check

## 2022-03-09 ENCOUNTER — OFFICE VISIT (OUTPATIENT)
Dept: OBSTETRICS AND GYNECOLOGY | Facility: CLINIC | Age: 32
End: 2022-03-09

## 2022-03-09 VITALS
WEIGHT: 141 LBS | SYSTOLIC BLOOD PRESSURE: 108 MMHG | BODY MASS INDEX: 26.62 KG/M2 | HEIGHT: 61 IN | DIASTOLIC BLOOD PRESSURE: 80 MMHG

## 2022-03-09 DIAGNOSIS — Z78.9 NON-SMOKER: ICD-10-CM

## 2022-03-09 DIAGNOSIS — N76.0 ACUTE VAGINITIS: Primary | ICD-10-CM

## 2022-03-09 DIAGNOSIS — E66.3 OVERWEIGHT WITH BODY MASS INDEX (BMI) OF 26 TO 26.9 IN ADULT: ICD-10-CM

## 2022-03-09 DIAGNOSIS — N89.8 VAGINAL ODOR: ICD-10-CM

## 2022-03-09 PROCEDURE — 87481 CANDIDA DNA AMP PROBE: CPT | Performed by: NURSE PRACTITIONER

## 2022-03-09 PROCEDURE — 87563 M. GENITALIUM AMP PROBE: CPT | Performed by: NURSE PRACTITIONER

## 2022-03-09 PROCEDURE — 87512 GARDNER VAG DNA QUANT: CPT | Performed by: NURSE PRACTITIONER

## 2022-03-09 PROCEDURE — 87661 TRICHOMONAS VAGINALIS AMPLIF: CPT | Performed by: NURSE PRACTITIONER

## 2022-03-09 PROCEDURE — 87798 DETECT AGENT NOS DNA AMP: CPT | Performed by: NURSE PRACTITIONER

## 2022-03-09 PROCEDURE — 87591 N.GONORRHOEAE DNA AMP PROB: CPT | Performed by: NURSE PRACTITIONER

## 2022-03-09 PROCEDURE — 99213 OFFICE O/P EST LOW 20 MIN: CPT | Performed by: NURSE PRACTITIONER

## 2022-03-09 PROCEDURE — 87491 CHLMYD TRACH DNA AMP PROBE: CPT | Performed by: NURSE PRACTITIONER

## 2022-03-09 RX ORDER — DOXYCYCLINE 100 MG/1
100 CAPSULE ORAL 2 TIMES DAILY
Qty: 14 CAPSULE | Refills: 0 | Status: SHIPPED | OUTPATIENT
Start: 2022-03-09 | End: 2022-03-31

## 2022-03-09 NOTE — PROGRESS NOTES
"Chief Complaint  Foreign Body in Vagina (Pt is here for a possible retained tampon.  Pt states it would have been a week and a half ago that she last put a tampon in.  Pt c/o having an odor as well as discharge )    Subjective          Kimberly Nassar presents to [facility]  Possible tampon in vagina  Last used a tampon approx 1.5 wks ago  Odor and discharge now      Review of Systems   Constitutional: Negative for activity change, appetite change, fatigue and fever.   Respiratory: Negative for apnea and shortness of breath.    Cardiovascular: Negative for chest pain and palpitations.   Gastrointestinal: Negative for abdominal distention, abdominal pain, constipation, diarrhea, nausea and vomiting.   Endocrine: Negative for cold intolerance and heat intolerance.   Genitourinary: Positive for vaginal discharge (odor). Negative for difficulty urinating, frequency, menstrual problem, pelvic pain and vaginal pain.   Neurological: Negative for headaches.   Psychiatric/Behavioral: Negative for agitation and sleep disturbance.         Objective   Vital Signs:   /80   Ht 154.9 cm (61\")   Wt 64 kg (141 lb)   BMI 26.64 kg/m²     Physical Exam  Constitutional:       Appearance: She is well-developed.   HENT:      Head: Normocephalic.   Neck:      Trachea: No tracheal deviation.   Cardiovascular:      Rate and Rhythm: Normal rate.   Pulmonary:      Breath sounds: Normal breath sounds. No wheezing.   Abdominal:      Palpations: Abdomen is soft.      Tenderness: There is no abdominal tenderness.   Genitourinary:     Labia:         Right: No rash, tenderness or lesion.         Left: No rash, tenderness or lesion.       Vagina: No vaginal discharge, erythema or tenderness.      Cervix: No cervical motion tenderness or discharge.      Uterus: Not deviated, not enlarged and not tender.       Adnexa:         Right: No mass, tenderness or fullness.          Left: No mass, tenderness or fullness.      "   Rectum: Normal.   Skin:     General: Skin is warm and dry.      Findings: No rash.   Neurological:      Mental Status: She is alert and oriented to person, place, and time.   Psychiatric:         Speech: Speech normal.         Behavior: Behavior normal.         Result Review :                       Assessment and Plan      Discussed pt symptoms.   Specimen collected for BV panel, gonorrhea and chlamydia testing.  Culture collected.   Doxycycline sent to pharmacy.     Diagnoses and all orders for this visit:    1. Acute vaginitis (Primary)  -     Gynecologic Fluid, Supplemental Testing  -     Trichomonas vaginalis, PCR - ThinPrep Vial, Cervix  -     Chlamydia trachomatis, Neisseria gonorrhoeae, PCR - ThinPrep Vial, Cervix  -     Cancel: Chlamydia trachomatis, Neisseria gonorrhoeae, PCR - ThinPrep Vial, Cervix  -     Cancel: Trichomonas vaginalis, PCR - ThinPrep Vial, Cervix    2. Vaginal odor  -     Anaerobic & Aerobic Culture ([facility] Only) - Swab, Cervix    3. Overweight with body mass index (BMI) of 26 to 26.9 in adult    4. Non-smoker    Other orders  -     doxycycline (MONODOX) 100 MG capsule; Take 1 capsule by mouth 2 (Two) Times a Day.  Dispense: 14 capsule; Refill: 0          Patient's Body mass index is 26.64 kg/m². indicating that she is overweight (BMI 25-29.9). Patient's (Body mass index is 26.64 kg/m².) indicates that they are overweight with health conditions that include none . Weight is unchanged. BMI is is above average; no BMI management plan is appropriate. We discussed portion control and increasing exercise. .       Follow Up   Return for Annual physical.    Patient was given instructions and counseling regarding her condition or for health maintenance advice. Please see specific information pulled into the AVS if appropriate.

## 2022-03-10 LAB
C TRACH RRNA CVX QL NAA+PROBE: NOT DETECTED
N GONORRHOEA RRNA SPEC QL NAA+PROBE: NOT DETECTED
TRICHOMONAS VAGINALIS PCR: NOT DETECTED

## 2022-03-14 DIAGNOSIS — F43.23 ADJUSTMENT DISORDER WITH MIXED ANXIETY AND DEPRESSED MOOD: Primary | ICD-10-CM

## 2022-03-14 LAB
BACTERIA SPEC AEROBE CULT: ABNORMAL
BACTERIA SPEC ANAEROBE CULT: ABNORMAL
BACTERIA SPEC CULT: ABNORMAL
LAB AP CASE REPORT: NORMAL
Lab: NORMAL
OTHER ANTIBIOTIC SUSC ISLT: ABNORMAL
PATH INTERP SPEC-IMP: NORMAL

## 2022-03-14 NOTE — TELEPHONE ENCOUNTER
Rx Refill Note  Requested Prescriptions     Pending Prescriptions Disp Refills   • sertraline (ZOLOFT) 50 MG tablet 90 tablet 0     Sig: Take 1 tablet by mouth Daily.      Last office visit with prescribing clinician: 2/9/2022      Next office visit with prescribing clinician: Visit date not found            SCOTTIE Abdul  03/14/22, 11:58 CDT     Patient requesting 90 day supply

## 2022-03-27 NOTE — PATIENT INSTRUCTIONS

## 2022-03-31 ENCOUNTER — OFFICE VISIT (OUTPATIENT)
Dept: OBSTETRICS AND GYNECOLOGY | Facility: CLINIC | Age: 32
End: 2022-03-31

## 2022-03-31 VITALS
DIASTOLIC BLOOD PRESSURE: 68 MMHG | BODY MASS INDEX: 27.38 KG/M2 | WEIGHT: 145 LBS | SYSTOLIC BLOOD PRESSURE: 108 MMHG | HEIGHT: 61 IN

## 2022-03-31 DIAGNOSIS — N93.8 DUB (DYSFUNCTIONAL UTERINE BLEEDING): ICD-10-CM

## 2022-03-31 DIAGNOSIS — Z30.46 ENCOUNTER FOR SURVEILLANCE OF IMPLANTABLE SUBDERMAL CONTRACEPTIVE: Primary | ICD-10-CM

## 2022-03-31 PROCEDURE — 99213 OFFICE O/P EST LOW 20 MIN: CPT | Performed by: NURSE PRACTITIONER

## 2022-03-31 NOTE — PROGRESS NOTES
"Subjective     Kimberly Nassar is a 31 y.o. female    Patient comes in today for follow-up of Nexplanon insertion.  She has no complaints.  She is just having some irregular spotting.    Contraception  This is a new problem. The current episode started more than 1 month ago. The problem occurs daily. Pertinent negatives include no abdominal pain, anorexia, arthralgias, change in bowel habit, chest pain, chills, congestion, coughing, diaphoresis, fatigue, fever, headaches, joint swelling, myalgias, nausea, neck pain, numbness, rash, sore throat, swollen glands, urinary symptoms, vertigo, visual change, vomiting or weakness. Nothing aggravates the symptoms. Treatments tried: Nexplanon. The treatment provided significant relief.         /68 (BP Location: Right arm, Patient Position: Sitting, Cuff Size: Adult)   Ht 154.9 cm (61\")   Wt 65.8 kg (145 lb)   LMP 03/31/2022 (Exact Date)   Breastfeeding No   BMI 27.40 kg/m²     Outpatient Encounter Medications as of 3/31/2022   Medication Sig Dispense Refill   • Etonogestrel (Nexplanon) 68 MG implant subdermal implant Inject 1 each into the appropriate area of the skin as directed by provider 1 (One) Time. Inserted 1/31/2022     • sertraline (ZOLOFT) 50 MG tablet Take 1 tablet by mouth Daily. 90 tablet 0   • vitamin D (ERGOCALCIFEROL) 1.25 MG (93223 UT) capsule capsule Take 1 capsule by mouth 1 (One) time per week with food for vitamin D deficiency 5 capsule 5   • [DISCONTINUED] doxycycline (MONODOX) 100 MG capsule Take 1 capsule by mouth 2 (Two) Times a Day. 14 capsule 0     Facility-Administered Encounter Medications as of 3/31/2022   Medication Dose Route Frequency Provider Last Rate Last Admin   • cyanocobalamin injection 1,000 mcg  1,000 mcg Intramuscular Q28 Days Yamini Jernigan MD   1,000 mcg at 08/04/21 1524       Surgical History  Past Surgical History:   Procedure Laterality Date   • D & C WITH SUCTION         Family History  Family History   Problem " Relation Age of Onset   • Colon cancer Father    • Lupus Father    • Diabetes Father    • Coronary artery disease Mother    • Hypertension Mother    • Arthritis Mother    • Breast cancer Maternal Grandmother        The following portions of the patient's history were reviewed and updated as appropriate: allergies, current medications, past family history, past medical history, past social history, past surgical history, and problem list.    Review of Systems   Constitutional: Negative for chills, diaphoresis, fatigue and fever.   HENT: Negative for congestion, sore throat and swollen glands.    Respiratory: Negative for cough.    Cardiovascular: Negative for chest pain.   Gastrointestinal: Negative for abdominal pain, anorexia, change in bowel habit, nausea and vomiting.   Genitourinary: Positive for vaginal bleeding.   Musculoskeletal: Negative for arthralgias, joint swelling, myalgias and neck pain.   Skin: Negative for rash.   Neurological: Negative for vertigo, weakness and numbness.       Objective   Physical Exam  Vitals and nursing note reviewed.   Constitutional:       Appearance: She is well-developed.   HENT:      Head: Normocephalic and atraumatic.   Eyes:      General:         Right eye: No discharge.         Left eye: No discharge.      Conjunctiva/sclera: Conjunctivae normal.   Neck:      Thyroid: No thyromegaly.   Cardiovascular:      Rate and Rhythm: Normal rate and regular rhythm.      Heart sounds: Normal heart sounds.   Pulmonary:      Effort: Pulmonary effort is normal.      Breath sounds: Normal breath sounds.   Musculoskeletal:         General: Normal range of motion.      Cervical back: Normal range of motion and neck supple.   Skin:     General: Skin is warm and dry.          Neurological:      Mental Status: She is alert and oriented to person, place, and time.   Psychiatric:         Mood and Affect: Mood normal.         Behavior: Behavior normal.         Thought Content: Thought content  normal.         Judgment: Judgment normal.         Assessment/Plan   Diagnoses and all orders for this visit:    1. Encounter for surveillance of implantable subdermal contraceptive (Primary)  Comments:  Patient is here for Nexplanon follow-up.  Nexplanon is in place.  She is very happy with her form of birth control.  Will return in November for yearly checkup.    2. DUB (dysfunctional uterine bleeding)  Comments:  Patient is just having occasional spotting.         Patient's Body mass index is 27.4 kg/m². indicating that she is overweight (BMI 25-29.9). Patient's (Body mass index is 27.4 kg/m².) indicates that they are overweight with health conditions that include none . Weight is unchanged. BMI is is above average; BMI management plan is completed. We discussed portion control and increasing exercise. .      Belinda Madera, APRN  3/31/2022

## 2022-04-21 ENCOUNTER — LAB (OUTPATIENT)
Dept: LAB | Facility: HOSPITAL | Age: 32
End: 2022-04-21

## 2022-04-21 DIAGNOSIS — D50.9 IRON DEFICIENCY ANEMIA, UNSPECIFIED IRON DEFICIENCY ANEMIA TYPE: ICD-10-CM

## 2022-04-21 DIAGNOSIS — R23.2 HOT FLASHES: ICD-10-CM

## 2022-04-21 DIAGNOSIS — E53.8 LOW VITAMIN B12 LEVEL: ICD-10-CM

## 2022-04-21 DIAGNOSIS — Z13.220 SCREENING CHOLESTEROL LEVEL: ICD-10-CM

## 2022-04-21 DIAGNOSIS — E55.9 VITAMIN D DEFICIENCY: ICD-10-CM

## 2022-04-21 DIAGNOSIS — Z86.2 HISTORY OF ANEMIA: ICD-10-CM

## 2022-04-21 LAB
25(OH)D3 SERPL-MCNC: 13.4 NG/ML (ref 30–100)
ALBUMIN SERPL-MCNC: 4.6 G/DL (ref 3.5–5.2)
ALBUMIN/GLOB SERPL: 1.8 G/DL
ALP SERPL-CCNC: 56 U/L (ref 39–117)
ALT SERPL W P-5'-P-CCNC: 15 U/L (ref 1–33)
ANION GAP SERPL CALCULATED.3IONS-SCNC: 10 MMOL/L (ref 5–15)
AST SERPL-CCNC: 19 U/L (ref 1–32)
BILIRUB SERPL-MCNC: 0.3 MG/DL (ref 0–1.2)
BUN SERPL-MCNC: 14 MG/DL (ref 6–20)
BUN/CREAT SERPL: 17.3 (ref 7–25)
CALCIUM SPEC-SCNC: 9.2 MG/DL (ref 8.6–10.5)
CHLORIDE SERPL-SCNC: 107 MMOL/L (ref 98–107)
CHOLEST SERPL-MCNC: 193 MG/DL (ref 0–200)
CO2 SERPL-SCNC: 23 MMOL/L (ref 22–29)
CREAT SERPL-MCNC: 0.81 MG/DL (ref 0.57–1)
DEPRECATED RDW RBC AUTO: 41 FL (ref 37–54)
EGFRCR SERPLBLD CKD-EPI 2021: 98.4 ML/MIN/1.73
ERYTHROCYTE [DISTWIDTH] IN BLOOD BY AUTOMATED COUNT: 12.2 % (ref 12.3–15.4)
GLOBULIN UR ELPH-MCNC: 2.6 GM/DL
GLUCOSE SERPL-MCNC: 85 MG/DL (ref 65–99)
HCT VFR BLD AUTO: 36.6 % (ref 34–46.6)
HDLC SERPL-MCNC: 76 MG/DL (ref 40–60)
HGB BLD-MCNC: 12.2 G/DL (ref 12–15.9)
IRON 24H UR-MRATE: 52 MCG/DL (ref 37–145)
IRON SATN MFR SERPL: 11 % (ref 20–50)
LDLC SERPL CALC-MCNC: 110 MG/DL (ref 0–100)
LDLC/HDLC SERPL: 1.46 {RATIO}
MCH RBC QN AUTO: 30.9 PG (ref 26.6–33)
MCHC RBC AUTO-ENTMCNC: 33.3 G/DL (ref 31.5–35.7)
MCV RBC AUTO: 92.7 FL (ref 79–97)
PLATELET # BLD AUTO: 292 10*3/MM3 (ref 140–450)
PMV BLD AUTO: 9.9 FL (ref 6–12)
POTASSIUM SERPL-SCNC: 4.1 MMOL/L (ref 3.5–5.2)
PROT SERPL-MCNC: 7.2 G/DL (ref 6–8.5)
RBC # BLD AUTO: 3.95 10*6/MM3 (ref 3.77–5.28)
SODIUM SERPL-SCNC: 140 MMOL/L (ref 136–145)
TIBC SERPL-MCNC: 459 MCG/DL (ref 298–536)
TRANSFERRIN SERPL-MCNC: 308 MG/DL (ref 200–360)
TRIGL SERPL-MCNC: 32 MG/DL (ref 0–150)
TSH SERPL DL<=0.05 MIU/L-ACNC: 1.72 UIU/ML (ref 0.27–4.2)
VIT B12 BLD-MCNC: 473 PG/ML (ref 211–946)
VLDLC SERPL-MCNC: 7 MG/DL (ref 5–40)
WBC NRBC COR # BLD: 3.79 10*3/MM3 (ref 3.4–10.8)

## 2022-04-21 PROCEDURE — 80061 LIPID PANEL: CPT

## 2022-04-21 PROCEDURE — 36415 COLL VENOUS BLD VENIPUNCTURE: CPT

## 2022-04-21 PROCEDURE — 83540 ASSAY OF IRON: CPT

## 2022-04-21 PROCEDURE — 82607 VITAMIN B-12: CPT

## 2022-04-21 PROCEDURE — 82306 VITAMIN D 25 HYDROXY: CPT

## 2022-04-21 PROCEDURE — 84466 ASSAY OF TRANSFERRIN: CPT

## 2022-04-21 PROCEDURE — 80050 GENERAL HEALTH PANEL: CPT

## 2022-05-11 DIAGNOSIS — E55.9 VITAMIN D DEFICIENCY: Primary | ICD-10-CM

## 2022-05-11 RX ORDER — ERGOCALCIFEROL 1.25 MG/1
50000 CAPSULE ORAL WEEKLY
Qty: 12 CAPSULE | Refills: 3 | Status: SHIPPED | OUTPATIENT
Start: 2022-05-11

## 2022-05-12 DIAGNOSIS — M25.561 ACUTE PAIN OF RIGHT KNEE: Primary | ICD-10-CM

## 2022-05-12 RX ORDER — TRAMADOL HYDROCHLORIDE 50 MG/1
50 TABLET ORAL 3 TIMES DAILY PRN
Qty: 12 TABLET | Refills: 0 | Status: SHIPPED | OUTPATIENT
Start: 2022-05-12

## 2022-05-12 NOTE — TELEPHONE ENCOUNTER
X-ray R knee and use Tramadol prn knee pain for now since NSAID's haven't helped.  Pt has appt to see me in 4 days.  Will recheck then.    No JACK entries.  Sent in #12 Tramadol.

## 2022-05-12 NOTE — TELEPHONE ENCOUNTER
Patient states that is having R knee pain, worsening over the last two weeks.  Has tried otc medications such as tylenol and ibuprofen without relief.    Appt scheduled with PCP for first available, Monday May 16th at 11:00 am    Discussed with PCP,she will order imaging and send in medication for patient.

## 2022-05-13 ENCOUNTER — HOSPITAL ENCOUNTER (OUTPATIENT)
Dept: GENERAL RADIOLOGY | Facility: HOSPITAL | Age: 32
Discharge: HOME | End: 2022-05-13
Admitting: FAMILY MEDICINE

## 2022-05-13 DIAGNOSIS — M25.561 ACUTE PAIN OF RIGHT KNEE: ICD-10-CM

## 2022-05-13 PROCEDURE — 73560 X-RAY EXAM OF KNEE 1 OR 2: CPT

## 2022-05-16 ENCOUNTER — OFFICE VISIT (OUTPATIENT)
Dept: FAMILY MEDICINE CLINIC | Facility: CLINIC | Age: 32
End: 2022-05-16

## 2022-05-16 VITALS
DIASTOLIC BLOOD PRESSURE: 80 MMHG | SYSTOLIC BLOOD PRESSURE: 120 MMHG | HEART RATE: 75 BPM | WEIGHT: 146 LBS | BODY MASS INDEX: 27.56 KG/M2 | RESPIRATION RATE: 16 BRPM | OXYGEN SATURATION: 97 % | HEIGHT: 61 IN

## 2022-05-16 DIAGNOSIS — F43.23 ADJUSTMENT DISORDER WITH MIXED ANXIETY AND DEPRESSED MOOD: ICD-10-CM

## 2022-05-16 DIAGNOSIS — M25.561 ACUTE PAIN OF RIGHT KNEE: ICD-10-CM

## 2022-05-16 DIAGNOSIS — D50.9 IRON DEFICIENCY ANEMIA, UNSPECIFIED IRON DEFICIENCY ANEMIA TYPE: ICD-10-CM

## 2022-05-16 DIAGNOSIS — M70.51 PES ANSERINUS BURSITIS OF RIGHT KNEE: ICD-10-CM

## 2022-05-16 DIAGNOSIS — E53.8 LOW VITAMIN B12 LEVEL: ICD-10-CM

## 2022-05-16 DIAGNOSIS — E55.9 VITAMIN D DEFICIENCY: ICD-10-CM

## 2022-05-16 DIAGNOSIS — S86.911A KNEE STRAIN, RIGHT, INITIAL ENCOUNTER: Primary | ICD-10-CM

## 2022-05-16 PROCEDURE — 99214 OFFICE O/P EST MOD 30 MIN: CPT | Performed by: FAMILY MEDICINE

## 2022-05-16 RX ORDER — METHYLPREDNISOLONE 4 MG/1
TABLET ORAL
Qty: 21 TABLET | Refills: 0 | Status: SHIPPED | OUTPATIENT
Start: 2022-05-16

## 2022-05-16 NOTE — PROGRESS NOTES
Chief Complaint  Knee Pain (Pt has right knee pain.  Tramadol doesn't seem to help with pain.)  Here for x-ray and lab results      Subjective          Kimberly Nassar presents to [facility] as an established patient.    History of Present Illness  The patient is a 31-year-old woman here with right knee pain. Tramadol has not been helping to alleviate her pain. We gave her a prescription of 12 pills last Friday, 05/13/2022, to see if she could have any acute improvement. We also ordered an x-ray of her knee. The results were reviewed with the patient today. She had no acute bony abnormality and she had normal joint spaces and no effusion.     The patient is on vitamin D high-dose therapy once a week due to vitamin D deficiency.     Recent labs from 04/21/2022 were reviewed today and are as follows:   · Lipid panel, total cholesterol 193, triglycerides 32, HDL 76, and .   · CMP normal with fasting blood sugar of 85 mg/dL  · B12 level 473   · Vitamin D-25 hydroxy level low at 13.4, previously 11.9 in 2021.   · Iron panel, low iron saturation of 11 percent, previously 10 percent in 2021. Other values normal.   · CBC had no anemia and was improved from previous readings on the RBC, hemoglobin and hematocrit which were previously mildly reduced. A previously mildly reduced white blood cell count is improved on this CBC.    The patient reports that her right knee pain began 2.5 weeks ago. She states that she woke up on a Thursday, 04/28/2022, and noticed some stiffness. The patient thought it was how she slept. She states that she took Tylenol for the first 3 days after that. Then the patient switched over to Motrin and Aleve, which did not seem to help much. She has been hobbling at work. The patient reports her knee has gotten worse despite doing the meds. She has been waking up from sleep whenever she turns or changes positions. The patient has been sleeping with a pillow  "underneath her knee if on her back. She reports if she turns on her side, she will have the pillow in between her legs. The patient said it does not hurt to push on the inside of her knee. But if she puts her legs together and sleeps that way, it will bother her. She can fully extend the R knee but she states that it feels stiff. The patient can go almost all the way up and then coming back, it starts to hurt even before she gets to 90 degrees of flexion while seated. She does not have any pain going up into her thigh. The patient states it is uncomfortable while standing even if she does not have much weight on it.  She has not had any problems taking steroids in the past.    She needs refills on Zoloft.  It is working well for her mood.    Past Medical History:   Diagnosis Date   • Anxiety    • Depression    • Multiple gestation        Current Outpatient Medications on File Prior to Visit   Medication Sig Dispense Refill   • Etonogestrel (Nexplanon) 68 MG implant subdermal implant Inject 1 each into the appropriate area of the skin as directed by provider 1 (One) Time. Inserted 1/31/2022     • traMADol (ULTRAM) 50 MG tablet Take 1 tablet by mouth 3 (Three) Times a Day As Needed for Moderate Pain . 12 tablet 0   • vitamin D (ERGOCALCIFEROL) 1.25 MG (22790 UT) capsule capsule Take 1 capsule by mouth 1 (One) Time Per Week. 12 capsule 3     Current Facility-Administered Medications on File Prior to Visit   Medication Dose Route Frequency Provider Last Rate Last Admin   • cyanocobalamin injection 1,000 mcg  1,000 mcg Intramuscular Q28 Days Yamini Jernigan MD   1,000 mcg at 08/04/21 1524         Objective   Vital Signs:  /80 (BP Location: Left arm, Patient Position: Sitting, Cuff Size: Adult)   Pulse 75   Resp 16   Ht 154.9 cm (61\")   Wt 66.2 kg (146 lb)   SpO2 97%   BMI 27.59 kg/m²       Physical Exam  Vitals reviewed.   Constitutional:       Appearance: Normal appearance. She is not ill-appearing.      " Comments: Stocky habitus.   Cardiovascular:      Rate and Rhythm: Normal rate.      Heart sounds: No murmur heard.  Pulmonary:      Effort: Pulmonary effort is normal.      Breath sounds: Normal breath sounds.   Musculoskeletal:      Right lower leg: No edema.      Left lower leg: No edema.      Comments: She has tenderness in the medial right knee at the joint line but more so at the medial ligaments and the pes anserine bursa area. She has minimal tenderness over the bursa at the distal right thigh and no tenderness across the lateral joint line, the prepatellar tendon, the patella itself or the tibial plateau on the lateral side. No tenderness at the femoral condyles per se. No swelling and no increased redness or warmth in the knee. She is able to stand. She has ability to flex her knee to 80 degrees and to extend it to about 10 degrees shy of full extension. She has tenderness with testing of the medial right knee ligaments but has no pain of the knee on exam for other ligaments, and there is no joint instability and there is no swelling behind the knee.   Lymphadenopathy:      Head:      Right side of head: No tonsillar adenopathy.      Left side of head: No tonsillar adenopathy.   Skin:     General: Skin is warm.      Findings: No rash.   Neurological:      General: No focal deficit present.      Mental Status: She is alert and oriented to person, place, and time.   Psychiatric:         Mood and Affect: Mood normal.         Behavior: Behavior normal.                   Assessment and Plan {CC Problem List  Visit Diagnosis   ROS  Review (Popup)  Health Maintenance  Quality  BestPractice  Medications  SmartSets  SnapShot Encounters  Media :23}   Diagnoses and all orders for this visit:    1. Knee strain, right, initial encounter (Primary)  -     methylPREDNISolone (MEDROL) 4 MG dose pack; Follow package directions.  Dispense: 21 tablet; Refill: 0    2. Pes anserinus bursitis of right knee  -      methylPREDNISolone (MEDROL) 4 MG dose pack; Follow package directions.  Dispense: 21 tablet; Refill: 0    3. Acute pain of right knee  -     methylPREDNISolone (MEDROL) 4 MG dose pack; Follow package directions.  Dispense: 21 tablet; Refill: 0    4. Adjustment disorder with mixed anxiety and depressed mood  -     sertraline (ZOLOFT) 50 MG tablet; Take 1 tablet by mouth Daily for mood  Dispense: 90 tablet; Refill: 3    5. Iron deficiency anemia, unspecified iron deficiency anemia type    6. Low vitamin B12 level    7. Vitamin D deficiency    She will let us know in 10 to 14 days if she is not significantly better and we can get her a referral to physical therapy and orthopedics as well if necessary. She may use heat on the knee as needed for stiffness and definitely should use ice before bed, so it does not wake her up. She will start the Medrol Dosepak today and stop the nonsteroidals until she is finished with the Medrol and then she can resume some ibuprofen or Advil as needed.     She has picked up her high-dose vitamin D to take once a week for her vitamin D deficiency that was seen on her recent labs this year and as well last year. She is going to restart her multivitamin with iron and vitamin D because of the borderline low vitamin B12 levels and persistent low serum iron levels.          Follow Up   Return if symptoms worsen or fail to improve.  Patient was given instructions and counseling regarding her condition or for health maintenance advice.   - Use multi-vitamin with iron and B12 daily    Please see specific information pulled into the AVS if appropriate.       Yamini Jernigan M.D.  Family Physician  [facility]          Transcribed from ambient dictation for Yamini Jernigan MD by Deb Jaime.  05/16/22   13:51 CDT    Patient verbalized consent to the visit recording.

## 2022-06-16 ENCOUNTER — CLINICAL SUPPORT (OUTPATIENT)
Dept: FAMILY MEDICINE CLINIC | Facility: CLINIC | Age: 32
End: 2022-06-16

## 2022-06-16 DIAGNOSIS — D50.9 IRON DEFICIENCY ANEMIA, UNSPECIFIED IRON DEFICIENCY ANEMIA TYPE: ICD-10-CM

## 2022-06-16 DIAGNOSIS — E53.8 LOW VITAMIN B12 LEVEL: ICD-10-CM

## 2022-06-16 DIAGNOSIS — E55.9 VITAMIN D DEFICIENCY: ICD-10-CM

## 2022-06-16 DIAGNOSIS — R25.2 MUSCLE CRAMP, NOCTURNAL: ICD-10-CM

## 2022-06-16 DIAGNOSIS — R53.83 FATIGUE, UNSPECIFIED TYPE: ICD-10-CM

## 2022-06-16 DIAGNOSIS — E53.8 LOW VITAMIN B12 LEVEL: Primary | ICD-10-CM

## 2022-06-16 PROCEDURE — 96372 THER/PROPH/DIAG INJ SC/IM: CPT | Performed by: FAMILY MEDICINE

## 2022-06-16 RX ORDER — CYANOCOBALAMIN 1000 UG/ML
1000 INJECTION, SOLUTION INTRAMUSCULAR; SUBCUTANEOUS
Status: SHIPPED | OUTPATIENT
Start: 2022-06-16

## 2022-06-16 RX ADMIN — CYANOCOBALAMIN 1000 MCG: 1000 INJECTION, SOLUTION INTRAMUSCULAR; SUBCUTANEOUS at 15:05

## 2022-06-16 NOTE — PROGRESS NOTES
This patient is known to have an iron deficiency anemia, vitamin D deficiency, some chronic fatigue, tingling in her feet and some nocturnal muscle cramps. She has been taking some oral B12 but called the office to ask if she could do a vitamin B12 shot, which is certainly fine with me.  I have ordered one for today and request she come back for nursing visit in a month for another B12 1000 mcg IM shot and then see me in mid August for her annual physical. She can do nonfasting labs the week before that and I have ordered those for the week of August 8.  This will be blood and urine testing.  I am adding hepatitis C screening because that shows on her care gaps as still needing to be done.  The patient has had a brief prescription for tramadol in May related to her ongoing knee pain but no chronic narcotic prescriptions; review of her JACK does show that the tramadol was filled.  No other entries for the past year.  UDS not needed.

## 2022-06-16 NOTE — PROGRESS NOTES
After obtaining consent, and per orders of Dr. Jernigan, injection of b-12 given by SCOTTIE Abdul. Patient instructed to remain in clinic for 20 minutes afterwards, and to report any adverse reaction to me immediately.  Patient tolerated well, no reaction noted.  Patient will return for b-12 injection on 7/22/22.

## 2023-02-15 ENCOUNTER — TELEPHONE (OUTPATIENT)
Dept: OBSTETRICS AND GYNECOLOGY | Facility: CLINIC | Age: 33
End: 2023-02-15
Payer: COMMERCIAL

## 2023-02-15 NOTE — TELEPHONE ENCOUNTER
I tried to call patient to reschedule appointment from cancellation list. Call was answered and then it was hung up, I tried to call patient again and I couldn't get through and I couldn't leave a voicemail.

## 2023-04-21 PROCEDURE — 87186 SC STD MICRODIL/AGAR DIL: CPT | Performed by: NURSE PRACTITIONER

## 2023-04-21 PROCEDURE — 87077 CULTURE AEROBIC IDENTIFY: CPT | Performed by: NURSE PRACTITIONER

## 2023-04-21 PROCEDURE — 87086 URINE CULTURE/COLONY COUNT: CPT | Performed by: NURSE PRACTITIONER

## 2023-04-25 ENCOUNTER — TELEPHONE (OUTPATIENT)
Dept: URGENT CARE | Facility: CLINIC | Age: 33
End: 2023-04-25
Payer: COMMERCIAL

## 2023-04-25 DIAGNOSIS — N30.01 ACUTE CYSTITIS WITH HEMATURIA: Primary | ICD-10-CM

## 2023-04-25 RX ORDER — FLUCONAZOLE 150 MG/1
150 TABLET ORAL ONCE
Qty: 3 TABLET | Refills: 0 | Status: SHIPPED | OUTPATIENT
Start: 2023-04-25 | End: 2023-04-29

## 2023-04-28 ENCOUNTER — OFFICE VISIT (OUTPATIENT)
Dept: OBSTETRICS AND GYNECOLOGY | Facility: CLINIC | Age: 33
End: 2023-04-28
Payer: COMMERCIAL

## 2023-04-28 VITALS — BODY MASS INDEX: 27.59 KG/M2 | SYSTOLIC BLOOD PRESSURE: 110 MMHG | HEIGHT: 61 IN | DIASTOLIC BLOOD PRESSURE: 74 MMHG

## 2023-04-28 DIAGNOSIS — Z11.3 SCREEN FOR STD (SEXUALLY TRANSMITTED DISEASE): ICD-10-CM

## 2023-04-28 DIAGNOSIS — N76.0 ACUTE VAGINITIS: Primary | ICD-10-CM

## 2023-04-28 NOTE — PROGRESS NOTES
Chief Complaint  vaginal odor (PT is here with c/o having some vaginal odor.  Wanting STD testing done as well.  Pt has no other complaints. )    Subjective          Jorge Arialexandre Nassar presents to [facility]  History of Present Illness    The patient presents to the office for follow-up.     The patient has been having symptoms for approximately 1 week.   Her boyfriend bought scented toilet tissue.   She used it 2 times, and she could already tell something was going on.   She did go to urgent care.   She was treated for a UTI.   She still has a foul odor to her urine.   She did have a strong urine smell.   She did have irritation.   Her test results came back, and she had E. coli.   She is taking her antibiotics, but she still has an odor.   Her symptoms have slightly became better since being on the antibiotic.   She denies color to her discharge.   She denies labial irritation.     The patient's bleeding is still heavy.   She still has big clots.   She has her menses every month.   Her menses were lasting 4 to 5 days.   Her menses are now lasting 7 days.   She denies any particular reason for Nexplanon over other birth control options.   She denies smoking.   Her weight has changed mildly.   She denies a steriod shot or steriod pack in the last 6 months.  She had breakthrough bleeding in 03/2023.    The patient declines blood work for HIV, hepatitis, and syphilis screening.       Review of Systems   Constitutional: Negative for activity change, appetite change, fatigue and fever.   Respiratory: Negative for apnea and shortness of breath.    Cardiovascular: Negative for chest pain and palpitations.   Gastrointestinal: Negative for abdominal distention, abdominal pain, constipation, diarrhea, nausea and vomiting.   Endocrine: Negative for cold intolerance and heat intolerance.   Genitourinary: Positive for menstrual problem (heavy with Nexplanon). Negative for difficulty urinating, frequency,  "pelvic pain and vaginal pain. Vaginal discharge: odor.   Neurological: Negative for headaches.   Psychiatric/Behavioral: Negative for agitation and sleep disturbance.         Objective   Vital Signs:   /74   Ht 154.9 cm (61\")   BMI 27.59 kg/m²     Physical Exam  Exam conducted with a chaperone present.   Constitutional:       Appearance: She is well-developed.   HENT:      Head: Normocephalic.   Neck:      Trachea: No tracheal deviation.   Cardiovascular:      Rate and Rhythm: Normal rate.   Pulmonary:      Breath sounds: Normal breath sounds. No wheezing.   Abdominal:      Palpations: Abdomen is soft.      Tenderness: There is no abdominal tenderness.   Genitourinary:     Exam position: Lithotomy position.      Labia:         Right: No rash, tenderness or lesion.         Left: No rash, tenderness or lesion.       Vagina: Vaginal discharge present. No erythema or tenderness.      Cervix: No cervical motion tenderness or discharge.      Uterus: Not deviated, not enlarged and not tender.       Adnexa:         Right: No mass, tenderness or fullness.          Left: No mass, tenderness or fullness.        Rectum: Normal.   Skin:     General: Skin is warm and dry.      Findings: No rash.   Neurological:      Mental Status: She is alert and oriented to person, place, and time.   Psychiatric:         Speech: Speech normal.         Behavior: Behavior normal.         Result Review :                       Assessment and Plan      The patient obtained a BV panel, mycoplasma panel, and STD panel.   She will be informed of the results.   She declined blood work for HIV, hepatitis, and syphilis.   She will not start an antibiotic until test results are back.     The patient was advised to use yogurt to prevent yeast infection.       Diagnoses and all orders for this visit:    1. Acute vaginitis (Primary)  -     NuSwab VG+ - Swab, Vagina  -     Genital Mycoplasmas BREANA, Swab - Swab, Cervix    2. Screen for STD (sexually " transmitted disease)          BMI is >= 25 and <30. (Overweight) The following options were offered after discussion;: weight loss educational material (shared in after visit summary)       Follow Up   No follow-ups on file.    Patient was given instructions and counseling regarding her condition or for health maintenance advice. Please see specific information pulled into the AVS if appropriate.     Transcribed from ambient dictation for BHARATH Angulo by Janelle Ramirez.  04/28/23   16:36 CDT    Patient or patient representative verbalized consent to the visit recording.  I have personally performed the services described in this document as transcribed by the above individual, and it is both accurate and complete.  BHARATH Angulo  5/10/2023  21:20 CDT

## 2023-05-03 LAB
A VAGINAE DNA VAG QL NAA+PROBE: ABNORMAL SCORE
BVAB2 DNA VAG QL NAA+PROBE: ABNORMAL SCORE
C ALBICANS DNA VAG QL NAA+PROBE: NEGATIVE
C GLABRATA DNA VAG QL NAA+PROBE: NEGATIVE
C TRACH DNA VAG QL NAA+PROBE: NEGATIVE
M GENITALIUM DNA SPEC QL NAA+PROBE: NEGATIVE
M HOMINIS DNA SPEC QL NAA+PROBE: POSITIVE
MEGA1 DNA VAG QL NAA+PROBE: ABNORMAL SCORE
N GONORRHOEA DNA VAG QL NAA+PROBE: NEGATIVE
T VAGINALIS DNA VAG QL NAA+PROBE: NEGATIVE
UREAPLASMA DNA SPEC QL NAA+PROBE: POSITIVE

## 2023-05-04 RX ORDER — DOXYCYCLINE 100 MG/1
100 CAPSULE ORAL 2 TIMES DAILY
Qty: 14 CAPSULE | Refills: 0 | Status: SHIPPED | OUTPATIENT
Start: 2023-05-04

## 2023-05-11 NOTE — PATIENT INSTRUCTIONS

## 2023-06-13 PROCEDURE — G0123 SCREEN CERV/VAG THIN LAYER: HCPCS | Performed by: NURSE PRACTITIONER

## 2023-06-13 PROCEDURE — 87624 HPV HI-RISK TYP POOLED RSLT: CPT | Performed by: NURSE PRACTITIONER

## 2023-08-02 ENCOUNTER — PROCEDURE VISIT (OUTPATIENT)
Dept: OBSTETRICS AND GYNECOLOGY | Facility: CLINIC | Age: 33
End: 2023-08-02
Payer: COMMERCIAL

## 2023-08-02 VITALS
HEIGHT: 61 IN | SYSTOLIC BLOOD PRESSURE: 106 MMHG | WEIGHT: 146 LBS | BODY MASS INDEX: 27.56 KG/M2 | DIASTOLIC BLOOD PRESSURE: 76 MMHG

## 2023-08-02 DIAGNOSIS — Z30.46 NEXPLANON REMOVAL: ICD-10-CM

## 2023-08-02 DIAGNOSIS — N92.0 MENORRHAGIA WITH REGULAR CYCLE: Primary | ICD-10-CM

## 2023-08-09 ENCOUNTER — TELEPHONE (OUTPATIENT)
Dept: OBSTETRICS AND GYNECOLOGY | Facility: CLINIC | Age: 33
End: 2023-08-09
Payer: COMMERCIAL

## 2023-08-09 DIAGNOSIS — Z30.430 ENCOUNTER FOR INITIAL INSERTION OF INTRAUTERINE CONTRACEPTIVE DEVICE: Primary | ICD-10-CM

## 2023-08-09 NOTE — TELEPHONE ENCOUNTER
Patient has decided she wishes to proceed with Mirena IUD ordering and placement.  Consent form signed in office today.

## 2023-09-05 ENCOUNTER — PROCEDURE VISIT (OUTPATIENT)
Dept: OBSTETRICS AND GYNECOLOGY | Facility: CLINIC | Age: 33
End: 2023-09-05
Payer: COMMERCIAL

## 2023-09-05 VITALS
BODY MASS INDEX: 26.81 KG/M2 | SYSTOLIC BLOOD PRESSURE: 116 MMHG | HEIGHT: 61 IN | DIASTOLIC BLOOD PRESSURE: 88 MMHG | WEIGHT: 142 LBS

## 2023-09-05 DIAGNOSIS — Z30.430 ENCOUNTER FOR INSERTION OF INTRAUTERINE CONTRACEPTIVE DEVICE (IUD): Primary | ICD-10-CM

## 2023-09-05 DIAGNOSIS — Z11.3 SCREENING FOR STD (SEXUALLY TRANSMITTED DISEASE): ICD-10-CM

## 2023-09-05 NOTE — PROGRESS NOTES
"Chief Complaint  Contraception (Mirena: LOT:[id number] EXP: OCT 2025 NDC: 69483-888-79 pt owned )    Subjective          Kimberly Nassar presents to [facility]  History of Present Illness  Nexplanon removed 8/2023    IUD insertion today.    Review of Systems   Constitutional:  Negative for activity change, appetite change, fatigue and fever.   Respiratory:  Negative for apnea and shortness of breath.    Cardiovascular:  Negative for chest pain and palpitations.   Gastrointestinal:  Negative for abdominal distention, abdominal pain, constipation, diarrhea, nausea and vomiting.   Endocrine: Negative for cold intolerance and heat intolerance.   Genitourinary:  Negative for difficulty urinating, frequency, menstrual problem, pelvic pain, vaginal discharge and vaginal pain.   Neurological:  Negative for headaches.   Psychiatric/Behavioral:  Negative for agitation and sleep disturbance.        Objective   Vital Signs:   /88   Ht 154.9 cm (61\")   Wt 64.4 kg (142 lb)   BMI 26.83 kg/m²     Physical Exam  Constitutional:       Appearance: She is well-developed.   HENT:      Head: Normocephalic.   Neck:      Trachea: No tracheal deviation.   Cardiovascular:      Rate and Rhythm: Normal rate.   Pulmonary:      Breath sounds: Normal breath sounds. No wheezing.   Abdominal:      Palpations: Abdomen is soft.      Tenderness: There is no abdominal tenderness.   Genitourinary:     Labia:         Right: No rash, tenderness or lesion.         Left: No rash, tenderness or lesion.       Vagina: No vaginal discharge, erythema or tenderness.      Cervix: No cervical motion tenderness or discharge.      Uterus: Not deviated, not enlarged and not tender.       Adnexa:         Right: No mass, tenderness or fullness.          Left: No mass, tenderness or fullness.        Rectum: Normal.   Skin:     General: Skin is warm and dry.      Findings: No rash.   Neurological:      Mental Status: She is alert and " oriented to person, place, and time.   Psychiatric:         Speech: Speech normal.         Behavior: Behavior normal.       Result Review :                       Assessment and Plan    IUD Insertion Procedure Note    Pre-operative Diagnosis: IUD insertion.     Post-operative Diagnosis: same    Indications: contraception    Procedure Details   Urine pregnancy test was done and was NEGATIVE .  The risks (including infection, bleeding, pain, and uterine perforation) and benefits of the procedure were explained to the patient and Writteninformed consent was obtained.    Cervix cleansed with Betadine. Uterus sounded to  8.5  cm. IUD inserted without difficulty. String visible and trimmed.    IUD Information:  Mirena, Lot # [id number], Expiration date OCT 2025, NDC: 63976-764-84.  Pt owned.     Condition:  Stable    Complications:  None  Patient tolerated the procedure well without complications.    Plan:    The patient was advised to call for any fever or for prolonged or severe pain or bleeding. She was advised to use NSAID as needed for mild to moderate pain.       Diagnoses and all orders for this visit:    1. Encounter for insertion of intrauterine contraceptive device (IUD) (Primary)  -     POC Pregnancy, Urine    2. Screening for STD (sexually transmitted disease)  -     Chlamydia trachomatis, Neisseria gonorrhoeae, PCR w/ confirmation - Swab, Cervix                  Follow Up   Return in about 1 month (around 10/5/2023) for IUD check, US and OV.    Patient was given instructions and counseling regarding her condition or for health maintenance advice. Please see specific information pulled into the AVS if appropriate.

## 2023-09-05 NOTE — PATIENT INSTRUCTIONS

## 2023-09-07 LAB
C TRACH RRNA SPEC QL NAA+PROBE: NEGATIVE
N GONORRHOEA RRNA SPEC QL NAA+PROBE: NEGATIVE

## 2023-09-25 ENCOUNTER — TELEPHONE (OUTPATIENT)
Dept: OBSTETRICS AND GYNECOLOGY | Facility: CLINIC | Age: 33
End: 2023-09-25

## 2023-09-25 NOTE — TELEPHONE ENCOUNTER
Caller: Kimberly Nassar    Relationship to patient: Self    Best call back number: [phone]    Type of visit: IN OFFICE PROCEDURE - IUD INSERTION    Requested date: ANY DAY EXCEPT TUESDAYS OR WEDNESDAY MORNINGS      If rescheduling, when is the original appointment: 10/6/23     Additional notes: PATIENT RETURNING CALL TO R/S IUD INSERTION (PROVIDER OUT OFFICE) - HUB UNABLE TO SCHEDULE IN OFFICE PROCEDURES - HUB UNABLE TO WARM TRANSFER CALL TO PRACTICE

## 2023-10-23 LAB
NCCN CRITERIA FLAG: NORMAL
TYRER CUZICK SCORE: 12.2

## 2023-11-07 ENCOUNTER — HOSPITAL ENCOUNTER (OUTPATIENT)
Dept: MAMMOGRAPHY | Facility: HOSPITAL | Age: 33
Discharge: HOME | End: 2023-11-07
Admitting: NURSE PRACTITIONER
Payer: COMMERCIAL

## 2023-11-07 DIAGNOSIS — Z12.31 ENCOUNTER FOR SCREENING MAMMOGRAM FOR MALIGNANT NEOPLASM OF BREAST: ICD-10-CM

## 2023-11-07 PROCEDURE — 77063 BREAST TOMOSYNTHESIS BI: CPT

## 2023-11-07 PROCEDURE — 77067 SCR MAMMO BI INCL CAD: CPT

## 2024-01-10 ENCOUNTER — OFFICE VISIT (OUTPATIENT)
Dept: OBSTETRICS AND GYNECOLOGY | Facility: CLINIC | Age: 34
End: 2024-01-10
Payer: COMMERCIAL

## 2024-01-10 VITALS
HEIGHT: 61 IN | DIASTOLIC BLOOD PRESSURE: 86 MMHG | SYSTOLIC BLOOD PRESSURE: 120 MMHG | BODY MASS INDEX: 27.19 KG/M2 | WEIGHT: 144 LBS

## 2024-01-10 DIAGNOSIS — N92.0 MENORRHAGIA WITH REGULAR CYCLE: ICD-10-CM

## 2024-01-10 DIAGNOSIS — Z97.5 IUD (INTRAUTERINE DEVICE) IN PLACE: Primary | ICD-10-CM

## 2024-01-10 PROCEDURE — 99213 OFFICE O/P EST LOW 20 MIN: CPT | Performed by: NURSE PRACTITIONER

## 2024-01-10 NOTE — PATIENT INSTRUCTIONS

## 2024-01-10 NOTE — PROGRESS NOTES
"Chief Complaint  Gynecologic Exam (Pt is here for a f/u with US to check IUD placement. )    Subjective          Kimberly Nassar presents to [facility]  History of Present Illness  The patient is a 33-year-old female who presents for evaluation of IUD placement.    The patient's IUD was placed on 09/05/2023.   She continues to experience bleeding every month.   She also experiences some bloating with menstruation.   Preceding her menstruation, she will spot for a couple of days to a week.   She then experiences bleeding for approximately 5 to 6 days.   The bleeding is not as heavy as it was before she had the IUD placed.        Review of Systems   Genitourinary:         IUD in place    Bleeding is decreasing         Objective   Vital Signs:   /86   Ht 154.9 cm (61\")   Wt 65.3 kg (144 lb)   BMI 27.21 kg/m²     Physical Exam  Vitals reviewed.   Constitutional:       Appearance: She is well-developed.   Eyes:      General:         Right eye: No discharge.         Left eye: No discharge.   Cardiovascular:      Rate and Rhythm: Normal rate and regular rhythm.   Pulmonary:      Effort: Pulmonary effort is normal.      Breath sounds: Normal breath sounds.   Skin:     General: Skin is warm.   Neurological:      Mental Status: She is alert and oriented to person, place, and time.   Psychiatric:         Behavior: Behavior normal.         Thought Content: Thought content normal.         Judgment: Judgment normal.         Result Review :   The following data was reviewed by: BHARATH Angulo on 01/10/2024:  OBGYN Diagnostics Review:   Lab Results   Component Value Date    CASEREPORT  06/13/2023     Gynecologic Cytology Report                       Case: [id number]                                  Authorizing Provider:  Keke Bailey APRN Collected:           06/13/2023 10:53 AM          Ordering Location:     [facility]     Received:            06/14/2023 06:52 AM  "                                [facility]                                                                  First Screen:          Judy Agustin                                                           Specimen:    Liquid-Based Pap, Screening, Cervix                                                        INTERPGYN Negative for intraepithelial lesion or malignancy 06/13/2023    GENCAT Within normal limits 06/13/2023    SPECADGYN  06/13/2023     Satisfactory for evaluation, endocervical/transformation zone component present    ADDINFO  06/13/2023     Disclaimer: Cervical cytology is a screening test primarily for squamous cancer and its precursors and has associated false-negative and false-positive results.  Technologies such as liquid-based preparations may decrease but will not eliminate all false-negative results.  Follow-up of unexplained clinical signs and symptoms is recommended to minimize false-negative results. (The [place] System for Reporting Cervical Cytology: Anderson, 2015).        HPV Aptima   Date Value Ref Range Status   06/13/2023 Not Detected Not Detected Final      Data reviewed : Radiologic studies transvaginal US      Narrative & Impression   Indication: IUD check  No comparison made  Interpretation: The uterus is normal size and measures 8.4 cm. There is an IUD in place within the endometrium at uterine fundus.  The right ovary appears normal without cysts or masses.  The left ovary is not visualized on today's exam.  There is no free fluid.     Electronically signed by Krystian Adam MD, 01/10/24, 10:33 AM CST.     Current Outpatient Medications on File Prior to Visit   Medication Sig    benzonatate (TESSALON) 200 MG capsule Take 1 capsule by mouth 3 (Three) Times a Day As Needed for Cough.    clindamycin (CLEOCIN T) 1 % external solution Apply once daily in the morning after cleansing face for acne    Levonorgestrel (MIRENA) 20 MCG/DAY intrauterine device IUD 1 each by  Intrauterine route 1 (One) Time for 1 dose.    spironolactone (ALDACTONE) 50 MG tablet take 1 tablet once daily for 2 weeks, then switch to 1 tablet twice daily if tolerated    tretinoin (RETIN-A) 0.025 % cream apply a pea sized amount to the face nightly with a moisturizer as tolerated for acne.    azithromycin (Zithromax Z-John) 250 MG tablet Take 2 tablets the first day, then 1 tablet daily for 4 days. (Patient not taking: Reported on 1/10/2024)    methylPREDNISolone (MEDROL) 4 MG dose pack Take as directed on package instructions. (Patient not taking: Reported on 1/10/2024)     No current facility-administered medications on file prior to visit.                Assessment and Plan      Menorrhagia with regular cycle.  The patient's menstruation is better than it was before the IUD was placed. She was advised to wait and see if her bleeding continues to diminish.    Follow-up  The patient will follow up over the summer for her annual exam.      Diagnoses and all orders for this visit:    1. IUD (intrauterine device) in place (Primary)    2. Menorrhagia with regular cycle             Follow Up   Return for Annual physical.    Patient was given instructions and counseling regarding her condition or for health maintenance advice. Please see specific information pulled into the AVS if appropriate.     Transcribed from ambient dictation for BHARATH Angulo by Ruth Mcdowell.  01/10/24   12:49 CST    Patient or patient representative verbalized consent to the visit recording.  I have personally performed the services described in this document as transcribed by the above individual, and it is both accurate and complete.  BHARATH Angulo  1/23/2024  21:14 CST

## 2024-06-17 ENCOUNTER — OFFICE VISIT (OUTPATIENT)
Dept: OBSTETRICS AND GYNECOLOGY | Age: 34
End: 2024-06-17
Payer: COMMERCIAL

## 2024-06-17 VITALS
DIASTOLIC BLOOD PRESSURE: 70 MMHG | BODY MASS INDEX: 29.07 KG/M2 | SYSTOLIC BLOOD PRESSURE: 112 MMHG | WEIGHT: 154 LBS | HEIGHT: 61 IN

## 2024-06-17 DIAGNOSIS — Z01.419 ENCOUNTER FOR GYNECOLOGICAL EXAMINATION WITHOUT ABNORMAL FINDING: Primary | ICD-10-CM

## 2024-06-17 DIAGNOSIS — Z12.31 SCREENING MAMMOGRAM, ENCOUNTER FOR: ICD-10-CM

## 2024-06-17 DIAGNOSIS — N89.8 VAGINAL DISCHARGE: ICD-10-CM

## 2024-06-17 NOTE — PROGRESS NOTES
Chief Complaint  Annual Exam (Pt is here for an annual exam/Last pap 6/13/23 WNL /Last mammogram 11/7/23 Normal/Pt has noticed after her last two cycles, she still feels some period cramping.   )  History of Present Illness  The patient is a 33-year-old female who is here for annual exam.    The patient reports experiencing cramping approximately one week post-menstruation, which typically lasts for a few days before subsiding.   This cramping, which she describes as a sensation akin to the onset of her menstrual cycle, is alleviated by Tylenol or ibuprofen.   Her menstrual cycle typically lasts for 5 days, and she denies any heavy bleeding.   She utilized tampons.   She has previously used Nexplanon for contraception.   She denies experiencing constipation, diarrhea, or urinary issues.   She also denies any abnormal discharge, itching, or odor.   Her last episode of cramping occurred last week.    Subjective          Kimberly Nassar presents to [facility]  History of Present Illness    Review of Systems   Constitutional:  Negative for activity change, appetite change, fatigue and fever.   HENT:  Negative for congestion, sore throat and trouble swallowing.    Eyes:  Negative for pain, discharge and visual disturbance.   Respiratory:  Negative for apnea, shortness of breath and wheezing.    Cardiovascular:  Negative for chest pain, palpitations and leg swelling.   Gastrointestinal:  Negative for abdominal pain, constipation and diarrhea.   Genitourinary:  Positive for pelvic pain (cramping pelvic discomfort between periods). Negative for frequency, urgency and vaginal discharge.        Light bleeding with IUD in place     Musculoskeletal:  Negative for back pain and gait problem.   Skin:  Negative for color change and rash.   Neurological:  Negative for dizziness, weakness and numbness.   Psychiatric/Behavioral:  Negative for confusion and sleep disturbance.         Objective   Vital Signs:  "  /70   Ht 154.9 cm (61\")   Wt 69.9 kg (154 lb)   BMI 29.10 kg/m²     Physical Exam  Vitals and nursing note reviewed. Exam conducted with a chaperone present.   Constitutional:       General: She is not in acute distress.     Appearance: She is well-developed. She is not diaphoretic.   HENT:      Head: Normocephalic.      Right Ear: External ear normal.      Left Ear: External ear normal.      Nose: Nose normal.   Eyes:      General: No scleral icterus.        Right eye: No discharge.         Left eye: No discharge.      Conjunctiva/sclera: Conjunctivae normal.      Pupils: Pupils are equal, round, and reactive to light.   Neck:      Thyroid: No thyromegaly.      Vascular: No carotid bruit.      Trachea: No tracheal deviation.   Cardiovascular:      Rate and Rhythm: Normal rate and regular rhythm.      Heart sounds: Normal heart sounds. No murmur heard.  Pulmonary:      Effort: Pulmonary effort is normal. No respiratory distress.      Breath sounds: Normal breath sounds. No wheezing.   Chest:   Breasts:     Breasts are symmetrical.      Right: Normal. No swelling, bleeding, inverted nipple, mass, nipple discharge, skin change or tenderness.      Left: Normal. No swelling, bleeding, inverted nipple, mass, nipple discharge, skin change or tenderness.   Abdominal:      General: There is no distension.      Palpations: Abdomen is soft. There is no mass.      Tenderness: There is no abdominal tenderness. There is no right CVA tenderness, left CVA tenderness or guarding.      Hernia: No hernia is present. There is no hernia in the left inguinal area or right inguinal area.   Genitourinary:     General: Normal vulva.      Exam position: Lithotomy position.      Labia:         Right: No rash, tenderness, lesion or injury.         Left: No rash, tenderness, lesion or injury.       Vagina: Normal. No signs of injury and foreign body. No vaginal discharge, erythema, tenderness or bleeding.      Cervix: Normal.      " Uterus: Normal. Not enlarged, not fixed and not tender.       Adnexa: Right adnexa normal and left adnexa normal.        Right: No mass, tenderness or fullness.          Left: No mass, tenderness or fullness.        Rectum: Normal. No mass.      Comments:   BSU normal  Urethral meatus  Normal  Perineum  Normal  Musculoskeletal:         General: No tenderness. Normal range of motion.      Cervical back: Normal range of motion and neck supple.   Lymphadenopathy:      Head:      Right side of head: No submental, submandibular, tonsillar, preauricular, posterior auricular or occipital adenopathy.      Left side of head: No submental, submandibular, tonsillar, preauricular, posterior auricular or occipital adenopathy.      Cervical: No cervical adenopathy.      Right cervical: No superficial, deep or posterior cervical adenopathy.     Left cervical: No superficial, deep or posterior cervical adenopathy.      Upper Body:      Right upper body: No supraclavicular, axillary or pectoral adenopathy.      Left upper body: No supraclavicular, axillary or pectoral adenopathy.      Lower Body: No right inguinal adenopathy. No left inguinal adenopathy.   Skin:     General: Skin is warm and dry.      Findings: No bruising, erythema or rash.   Neurological:      Mental Status: She is alert and oriented to person, place, and time.      Coordination: Coordination normal.   Psychiatric:         Mood and Affect: Mood normal.         Behavior: Behavior normal.         Thought Content: Thought content normal.         Judgment: Judgment normal.       Physical Exam    Result Review :   The following data was reviewed by: BHARATH Angulo on 06/17/2024:    Data reviewed : Radiologic studies transvaginal US, mammogram    Narrative & Impression   Indication: IUD check  No comparison made  Interpretation: The uterus is normal size and measures 8.4 cm. There is an IUD in place within the endometrium at uterine fundus.  The right ovary  appears normal without cysts or masses.  The left ovary is not visualized on today's exam.  There is no free fluid.     Electronically signed by Krystian Adam MD, 01/10/24, 10:33 AM CST.         Current Outpatient Medications on File Prior to Visit   Medication Sig    clindamycin (CLEOCIN T) 1 % external solution Apply once daily in the morning after cleansing face for acne    Levonorgestrel (MIRENA) 20 MCG/DAY intrauterine device IUD 1 each by Intrauterine route 1 (One) Time for 1 dose.    spironolactone (ALDACTONE) 50 MG tablet take 1 tablet once daily for 2 weeks, then switch to 1 tablet twice daily if tolerated (Patient taking differently: 2 tablets Daily.)    tretinoin (RETIN-A) 0.025 % cream apply a pea sized amount to the face nightly with a moisturizer as tolerated for acne.     No current facility-administered medications on file prior to visit.          Assessment and Plan      Well woman GYN exam.   Pap smear not indicated per ASCCP guidelines.   Pelvic exam with chaperone present.     Will have lab work at PCP.     Discussed STD prevention and testing.   Chlamydia/Gonorrhea/Trichomonas ordered.   RPR, Hep panel and HIV declined.     Mammogram will be scheduled at [facility].     Assessment & Plan  1. Intermittent pelvic pain.  A vaginal swab will be conducted to check for bacteria/yeast, gonorrhea, Chlamydia, and trichomonas.   Pt to return for US and OV r/t pelvic pain/discomfort.     Diagnoses and all orders for this visit:    1. Encounter for gynecological examination without abnormal finding (Primary)    2. Screening mammogram, encounter for  -     Mammo Screening Digital Tomosynthesis Bilateral With CAD; Future    3. Vaginal discharge  -     Genital Mycoplasmas BREANA, Swab - Swab, Vagina  -     NuSwab VG+ - Swab, Vagina    Other orders  -     Request Problem          BMI is >= 25 and <30. (Overweight) The following options were offered after discussion;: information on healthy weight added to patient's  after visit summary        Follow Up   Return in about 3 weeks (around 7/8/2024) for US and OV.    Patient was given instructions and counseling regarding her condition or for health maintenance advice. Please see specific information pulled into the AVS if appropriate.     Patient or patient representative verbalized consent for the use of Ambient Listening during the visit with  BHARATH Angulo for chart documentation. 6/29/2024  13:32 CDT

## 2024-06-21 LAB
A VAGINAE DNA VAG QL NAA+PROBE: ABNORMAL SCORE
BVAB2 DNA VAG QL NAA+PROBE: ABNORMAL SCORE
C ALBICANS DNA VAG QL NAA+PROBE: NEGATIVE
C GLABRATA DNA VAG QL NAA+PROBE: NEGATIVE
C TRACH DNA SPEC QL NAA+PROBE: NEGATIVE
M GENITALIUM DNA SPEC QL NAA+PROBE: ABNORMAL
M HOMINIS DNA SPEC QL NAA+PROBE: POSITIVE
MEGA1 DNA VAG QL NAA+PROBE: ABNORMAL SCORE
N GONORRHOEA DNA VAG QL NAA+PROBE: NEGATIVE
REQUEST PROBLEM: NORMAL
T VAGINALIS DNA VAG QL NAA+PROBE: NEGATIVE
UREAPLASMA DNA SPEC QL NAA+PROBE: POSITIVE

## 2024-06-25 RX ORDER — DOXYCYCLINE 100 MG/1
100 CAPSULE ORAL 2 TIMES DAILY
Qty: 14 CAPSULE | Refills: 0 | Status: SHIPPED | OUTPATIENT
Start: 2024-06-25

## 2024-06-25 RX ORDER — METRONIDAZOLE 500 MG/1
500 TABLET ORAL 2 TIMES DAILY
Qty: 14 TABLET | Refills: 0 | Status: SHIPPED | OUTPATIENT
Start: 2024-06-25 | End: 2024-07-03

## 2024-06-30 NOTE — PATIENT INSTRUCTIONS

## 2024-07-16 ENCOUNTER — OFFICE VISIT (OUTPATIENT)
Dept: OBSTETRICS AND GYNECOLOGY | Age: 34
End: 2024-07-16
Payer: COMMERCIAL

## 2024-07-16 VITALS
HEIGHT: 61 IN | SYSTOLIC BLOOD PRESSURE: 100 MMHG | DIASTOLIC BLOOD PRESSURE: 74 MMHG | WEIGHT: 153 LBS | BODY MASS INDEX: 28.89 KG/M2

## 2024-07-16 DIAGNOSIS — Z97.5 IUD (INTRAUTERINE DEVICE) IN PLACE: Primary | ICD-10-CM

## 2024-07-16 NOTE — PROGRESS NOTES
"Chief Complaint  Gynecologic Exam (PT is here for a f/u with US to check IUD placement.  Pt had been having cramping after menstrual cycles like she was about to start again that are sporadic and not daily.  Had this cramping July 10th, 12th and 15th.  )  History of Present Illness  The patient is a 41-year-old female who presents for evaluation of irregular bleeding.    The patient's intrauterine device (IUD) was inserted in 09/2023.   She experienced monthly bleeding, which commenced between 05/2023 and 06/2023, accompanied by cramping.   Initially, the IUD was implanted, she experienced irregular bleeding until 12/2023, which subsequently improved.   The bleeding has now reduced to approximately 5 to 6 days, with brown blood still present.   Recently, she has been experiencing cramping prior to and after her menstrual cycle, which she describes as similar to premenstrual cramping.   Despite this, she no longer experiences bleeding.   The severity of her cramping varies, and she manages it with ibuprofen or Tylenol, which provides temporary relief.   The cramping is not constant but sporadic.    Subjective          Jorge Aturony Nassar presents to [facility]  History of Present Illness    Review of Systems   Genitourinary:         Cramping         Objective   Vital Signs:   /74   Ht 154.9 cm (61\")   Wt 69.4 kg (153 lb)   BMI 28.91 kg/m²     Physical Exam  Vitals reviewed.   Constitutional:       Appearance: She is well-developed.   Eyes:      General:         Right eye: No discharge.         Left eye: No discharge.   Cardiovascular:      Rate and Rhythm: Normal rate and regular rhythm.   Pulmonary:      Effort: Pulmonary effort is normal.      Breath sounds: Normal breath sounds.   Skin:     General: Skin is warm.   Neurological:      Mental Status: She is alert and oriented to person, place, and time.   Psychiatric:         Behavior: Behavior normal.         Thought Content: Thought " content normal.         Judgment: Judgment normal.       Physical Exam      Result Review :          Narrative & Impression  Indication: IUD check  No comparison made  Interpretation: The uterus is normal size and measures 7.8 cm. There is an IUD in place within the endometrium at uterine fundus.  The ovaries appear normal without cysts or masses.  There is no free fluid.     Electronically signed by Krystian Adam MD, 07/17/24, 1:34 PM CDT.    Current Outpatient Medications on File Prior to Visit   Medication Sig    Levonorgestrel (MIRENA) 20 MCG/DAY intrauterine device IUD 1 each by Intrauterine route 1 (One) Time for 1 dose.    spironolactone (ALDACTONE) 50 MG tablet take 1 tablet once daily for 2 weeks, then switch to 1 tablet twice daily if tolerated (Patient taking differently: 2 tablets Daily.)    tretinoin (RETIN-A) 0.025 % cream apply a pea sized amount to the face nightly with a moisturizer as tolerated for acne.    clindamycin (CLEOCIN T) 1 % external solution Apply once daily in the morning after cleansing face for acne (Patient not taking: Reported on 7/16/2024)    doxycycline (MONODOX) 100 MG capsule Take 1 capsule by mouth 2 (Two) Times a Day. (Patient not taking: Reported on 7/16/2024)     No current facility-administered medications on file prior to visit.          Assessment and Plan      Assessment & Plan  1. Irregular bleeding.  The patient's ultrasound results were satisfactory, revealing an IUD in the appropriate position.   Reviewed variation of bleeding expected with IUD.   The patient was advised to monitor her bowel movement frequency, consistency, and bloating, as this could be indicative of gastrointestinal cramping rather than uterine cramping.   Emphasis was placed on the importance of adequate hydration.  Pt advised to call with any questions or concerns.   Discussed S&S to report. Pt voiced understanding.       Diagnoses and all orders for this visit:    1. IUD (intrauterine  device) in place (Primary)                  Follow Up   Return for Annual physical.    Patient was given instructions and counseling regarding her condition or for health maintenance advice. Please see specific information pulled into the AVS if appropriate.       Patient or patient representative verbalized consent for the use of Ambient Listening during the visit with  BHARATH Angulo for chart documentation. 7/28/2024  21:09 CDT

## 2024-07-26 NOTE — TELEPHONE ENCOUNTER
Pt has sent a message requesting diflucan to be sent to the Pharmacy for a yeast infection from the two rounds of antibiotics our office recently sent in.  Please advise.

## 2024-07-29 RX ORDER — FLUCONAZOLE 150 MG/1
150 TABLET ORAL
Qty: 2 TABLET | Refills: 0 | Status: SHIPPED | OUTPATIENT
Start: 2024-07-29

## 2024-07-29 NOTE — PATIENT INSTRUCTIONS

## 2024-10-22 ENCOUNTER — APPOINTMENT (OUTPATIENT)
Dept: GENERAL RADIOLOGY | Facility: HOSPITAL | Age: 34
End: 2024-10-22
Payer: OTHER MISCELLANEOUS

## 2024-10-22 ENCOUNTER — HOSPITAL ENCOUNTER (EMERGENCY)
Facility: HOSPITAL | Age: 34
Discharge: HOME | End: 2024-10-22
Admitting: FAMILY MEDICINE
Payer: OTHER MISCELLANEOUS

## 2024-10-22 VITALS
BODY MASS INDEX: 28.04 KG/M2 | WEIGHT: 148.5 LBS | HEIGHT: 61 IN | HEART RATE: 73 BPM | SYSTOLIC BLOOD PRESSURE: 138 MMHG | RESPIRATION RATE: 16 BRPM | OXYGEN SATURATION: 100 % | DIASTOLIC BLOOD PRESSURE: 84 MMHG | TEMPERATURE: 97.6 F
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
## 2024-10-22 PROCEDURE — 72110 X-RAY EXAM L-2 SPINE 4/>VWS: CPT

## 2024-10-22 PROCEDURE — 73130 X-RAY EXAM OF HAND: CPT

## 2024-10-22 PROCEDURE — 73090 X-RAY EXAM OF FOREARM: CPT

## 2024-10-22 PROCEDURE — 73060 X-RAY EXAM OF HUMERUS: CPT

## 2024-10-22 PROCEDURE — 73502 X-RAY EXAM HIP UNI 2-3 VIEWS: CPT

## 2024-10-22 PROCEDURE — 99283 EMERGENCY DEPT VISIT LOW MDM: CPT

## 2024-10-22 RX ORDER — CYCLOBENZAPRINE HCL 10 MG
10 TABLET ORAL ONCE
Status: COMPLETED | OUTPATIENT
Start: 2024-10-22 | End: 2024-10-22

## 2024-10-22 RX ORDER — KETOROLAC TROMETHAMINE 10 MG/1
10 TABLET, FILM COATED ORAL ONCE
Status: COMPLETED | OUTPATIENT
Start: 2024-10-22 | End: 2024-10-22

## 2024-10-22 RX ADMIN — CYCLOBENZAPRINE HYDROCHLORIDE 10 MG: 10 TABLET, FILM COATED ORAL at 18:29

## 2024-10-22 RX ADMIN — KETOROLAC TROMETHAMINE 10 MG: 10 TABLET, FILM COATED ORAL at 18:29
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
## 2024-10-22 NOTE — Clinical Note
[facility] EMERGENCY DEPARTMENT  [address]  [facility] [address]  Phone: [phone]    Kimberly Nassar was seen and treated in our emergency department on 10/22/2024.  She may return to work on 10/24/2024.         Thank you for choosing [facility].    Damaris Fox APRN

## 2024-10-23 NOTE — DISCHARGE INSTRUCTIONS
Today you are seen in the ER for your symptoms.  Your x-rays are negative for acute findings.  Please follow-up with PCP as soon as possible to reassess symptoms.  Return to the ER for any new or worsening symptoms.

## 2024-10-24 ENCOUNTER — OFFICE VISIT (OUTPATIENT)
Age: 34
End: 2024-10-24
Payer: OTHER MISCELLANEOUS

## 2024-10-24 VITALS — BODY MASS INDEX: 27.94 KG/M2 | RESPIRATION RATE: 18 BRPM | HEIGHT: 61 IN | WEIGHT: 148 LBS
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
## 2024-10-24 DIAGNOSIS — S49.92XA INJURY OF LEFT SHOULDER AND UPPER ARM, INITIAL ENCOUNTER: Primary | ICD-10-CM

## 2024-10-24 RX ORDER — METHOCARBAMOL 750 MG/1
750 TABLET, FILM COATED ORAL NIGHTLY PRN
Qty: 20 TABLET | Refills: 0 | Status: SHIPPED | OUTPATIENT
Start: 2024-10-24

## 2024-10-24 RX ORDER — DICLOFENAC SODIUM 75 MG/1
75 TABLET, DELAYED RELEASE ORAL 2 TIMES DAILY
Qty: 30 TABLET | Refills: 0 | Status: SHIPPED | OUTPATIENT
Start: 2024-10-24
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
## 2024-10-24 NOTE — LETTER
October 24, 2024     Patient: Kimberly Nassar   YOB: 1990   Date of Visit: 10/24/2024       To Whom It May Concern:    It is my medical opinion that Kimberly Nassar may return to light duty immediately with the following restrictions: 10/28/24 No pushing, pulling, or heavy lifting .           Sincerely,        Rick Najera MD

## 2024-11-05 ENCOUNTER — OFFICE VISIT (OUTPATIENT)
Age: 34
End: 2024-11-05
Payer: OTHER MISCELLANEOUS

## 2024-11-05 VITALS — BODY MASS INDEX: 27.94 KG/M2 | HEIGHT: 61 IN | WEIGHT: 148 LBS
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
## 2024-11-05 DIAGNOSIS — S49.92XA INJURY OF LEFT SHOULDER AND UPPER ARM, INITIAL ENCOUNTER: Primary | ICD-10-CM
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
## 2024-11-08 ENCOUNTER — HOSPITAL ENCOUNTER (OUTPATIENT)
Dept: MAMMOGRAPHY | Facility: HOSPITAL | Age: 34
Discharge: HOME | End: 2024-11-08
Admitting: NURSE PRACTITIONER
Payer: COMMERCIAL

## 2024-11-08 DIAGNOSIS — Z12.31 SCREENING MAMMOGRAM, ENCOUNTER FOR: ICD-10-CM

## 2024-11-08 PROCEDURE — 77063 BREAST TOMOSYNTHESIS BI: CPT

## 2024-11-08 PROCEDURE — 77067 SCR MAMMO BI INCL CAD: CPT

## 2024-12-04 ENCOUNTER — OFFICE VISIT (OUTPATIENT)
Dept: FAMILY MEDICINE CLINIC | Facility: CLINIC | Age: 34
End: 2024-12-04
Payer: COMMERCIAL

## 2024-12-04 VITALS
HEART RATE: 71 BPM | TEMPERATURE: 98.2 F | WEIGHT: 148 LBS | RESPIRATION RATE: 19 BRPM | HEIGHT: 61 IN | DIASTOLIC BLOOD PRESSURE: 65 MMHG | OXYGEN SATURATION: 97 % | SYSTOLIC BLOOD PRESSURE: 110 MMHG | BODY MASS INDEX: 27.94 KG/M2

## 2024-12-04 DIAGNOSIS — Z80.0 FAMILY HX OF COLON CANCER: ICD-10-CM

## 2024-12-04 DIAGNOSIS — R53.83 OTHER FATIGUE: ICD-10-CM

## 2024-12-04 DIAGNOSIS — L70.0 CYSTIC ACNE: ICD-10-CM

## 2024-12-04 DIAGNOSIS — Z11.59 ENCOUNTER FOR HEPATITIS C SCREENING TEST FOR LOW RISK PATIENT: ICD-10-CM

## 2024-12-04 DIAGNOSIS — E55.9 VITAMIN D DEFICIENCY: ICD-10-CM

## 2024-12-04 DIAGNOSIS — E53.8 LOW SERUM VITAMIN B12: ICD-10-CM

## 2024-12-04 DIAGNOSIS — G47.00 INSOMNIA, UNSPECIFIED TYPE: ICD-10-CM

## 2024-12-04 DIAGNOSIS — Z00.00 WELL ADULT EXAM: Primary | ICD-10-CM

## 2024-12-04 PROCEDURE — 99214 OFFICE O/P EST MOD 30 MIN: CPT | Performed by: NURSE PRACTITIONER

## 2024-12-04 PROCEDURE — 99395 PREV VISIT EST AGE 18-39: CPT | Performed by: NURSE PRACTITIONER

## 2024-12-04 RX ORDER — CLONIDINE HYDROCHLORIDE 0.1 MG/1
0.1 TABLET ORAL NIGHTLY PRN
Qty: 90 TABLET | Refills: 0 | Status: SHIPPED | OUTPATIENT
Start: 2024-12-04
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
## 2024-12-05 ENCOUNTER — LAB (OUTPATIENT)
Dept: LAB | Facility: HOSPITAL | Age: 34
End: 2024-12-05
Payer: COMMERCIAL

## 2024-12-05 DIAGNOSIS — E55.9 VITAMIN D DEFICIENCY: ICD-10-CM

## 2024-12-05 DIAGNOSIS — Z11.59 ENCOUNTER FOR HEPATITIS C SCREENING TEST FOR LOW RISK PATIENT: ICD-10-CM

## 2024-12-05 DIAGNOSIS — R53.83 OTHER FATIGUE: ICD-10-CM

## 2024-12-05 DIAGNOSIS — Z00.00 WELL ADULT EXAM: ICD-10-CM

## 2024-12-05 DIAGNOSIS — E53.8 LOW SERUM VITAMIN B12: ICD-10-CM

## 2024-12-05 LAB
ALBUMIN SERPL-MCNC: 3.9 G/DL (ref 3.5–5.2)
ALBUMIN/GLOB SERPL: 1.7 G/DL
ALP SERPL-CCNC: 54 U/L (ref 39–117)
ALT SERPL W P-5'-P-CCNC: 10 U/L (ref 1–33)
ANION GAP SERPL CALCULATED.3IONS-SCNC: 7 MMOL/L (ref 5–15)
AST SERPL-CCNC: 15 U/L (ref 1–32)
BACTERIA UR QL AUTO: ABNORMAL /HPF
BASOPHILS # BLD AUTO: 0.02 10*3/MM3 (ref 0–0.2)
BASOPHILS NFR BLD AUTO: 0.5 % (ref 0–1.5)
BILIRUB SERPL-MCNC: 0.4 MG/DL (ref 0–1.2)
BILIRUB UR QL STRIP: NEGATIVE
BUN SERPL-MCNC: 11 MG/DL (ref 6–20)
BUN/CREAT SERPL: 15.9 (ref 7–25)
CALCIUM SPEC-SCNC: 8.6 MG/DL (ref 8.6–10.5)
CHLORIDE SERPL-SCNC: 106 MMOL/L (ref 98–107)
CHOLEST SERPL-MCNC: 163 MG/DL (ref 0–200)
CLARITY UR: ABNORMAL
CO2 SERPL-SCNC: 25 MMOL/L (ref 22–29)
COLOR UR: YELLOW
CREAT SERPL-MCNC: 0.69 MG/DL (ref 0.57–1)
DEPRECATED RDW RBC AUTO: 47.3 FL (ref 37–54)
EGFRCR SERPLBLD CKD-EPI 2021: 115.5 ML/MIN/1.73
EOSINOPHIL # BLD AUTO: 0.08 10*3/MM3 (ref 0–0.4)
EOSINOPHIL NFR BLD AUTO: 2.1 % (ref 0.3–6.2)
ERYTHROCYTE [DISTWIDTH] IN BLOOD BY AUTOMATED COUNT: 13.1 % (ref 12.3–15.4)
GLOBULIN UR ELPH-MCNC: 2.3 GM/DL
GLUCOSE SERPL-MCNC: 83 MG/DL (ref 65–99)
GLUCOSE UR STRIP-MCNC: NEGATIVE MG/DL
HCT VFR BLD AUTO: 36 % (ref 34–46.6)
HCV AB SER QL: NORMAL
HDLC SERPL-MCNC: 60 MG/DL (ref 40–60)
HGB BLD-MCNC: 11.5 G/DL (ref 12–15.9)
HGB UR QL STRIP.AUTO: ABNORMAL
HYALINE CASTS UR QL AUTO: ABNORMAL /LPF
IMM GRANULOCYTES # BLD AUTO: 0.01 10*3/MM3 (ref 0–0.05)
IMM GRANULOCYTES NFR BLD AUTO: 0.3 % (ref 0–0.5)
KETONES UR QL STRIP: NEGATIVE
LDLC SERPL CALC-MCNC: 92 MG/DL (ref 0–100)
LDLC/HDLC SERPL: 1.54 {RATIO}
LEUKOCYTE ESTERASE UR QL STRIP.AUTO: ABNORMAL
LYMPHOCYTES # BLD AUTO: 1.16 10*3/MM3 (ref 0.7–3.1)
LYMPHOCYTES NFR BLD AUTO: 30 % (ref 19.6–45.3)
MCH RBC QN AUTO: 31.2 PG (ref 26.6–33)
MCHC RBC AUTO-ENTMCNC: 31.9 G/DL (ref 31.5–35.7)
MCV RBC AUTO: 97.6 FL (ref 79–97)
MONOCYTES # BLD AUTO: 0.31 10*3/MM3 (ref 0.1–0.9)
MONOCYTES NFR BLD AUTO: 8 % (ref 5–12)
NEUTROPHILS NFR BLD AUTO: 2.29 10*3/MM3 (ref 1.7–7)
NEUTROPHILS NFR BLD AUTO: 59.1 % (ref 42.7–76)
NITRITE UR QL STRIP: NEGATIVE
NRBC BLD AUTO-RTO: 0 /100 WBC (ref 0–0.2)
PH UR STRIP.AUTO: 6 [PH] (ref 5–8)
PLATELET # BLD AUTO: 254 10*3/MM3 (ref 140–450)
PMV BLD AUTO: 8.9 FL (ref 6–12)
POTASSIUM SERPL-SCNC: 3.6 MMOL/L (ref 3.5–5.2)
PROT SERPL-MCNC: 6.2 G/DL (ref 6–8.5)
PROT UR QL STRIP: ABNORMAL
RBC # BLD AUTO: 3.69 10*6/MM3 (ref 3.77–5.28)
RBC # UR STRIP: ABNORMAL /HPF
REF LAB TEST METHOD: ABNORMAL
SODIUM SERPL-SCNC: 138 MMOL/L (ref 136–145)
SP GR UR STRIP: 1.02 (ref 1–1.03)
SQUAMOUS #/AREA URNS HPF: ABNORMAL /HPF
TRIGL SERPL-MCNC: 53 MG/DL (ref 0–150)
TSH SERPL DL<=0.05 MIU/L-ACNC: 1.59 UIU/ML (ref 0.27–4.2)
UROBILINOGEN UR QL STRIP: ABNORMAL
VLDLC SERPL-MCNC: 11 MG/DL (ref 5–40)
WBC # UR STRIP: ABNORMAL /HPF
WBC NRBC COR # BLD AUTO: 3.87 10*3/MM3 (ref 3.4–10.8)

## 2024-12-05 PROCEDURE — 87086 URINE CULTURE/COLONY COUNT: CPT

## 2024-12-05 PROCEDURE — 82306 VITAMIN D 25 HYDROXY: CPT

## 2024-12-05 PROCEDURE — 82607 VITAMIN B-12: CPT

## 2024-12-05 PROCEDURE — 36415 COLL VENOUS BLD VENIPUNCTURE: CPT

## 2024-12-05 PROCEDURE — 86803 HEPATITIS C AB TEST: CPT

## 2024-12-05 PROCEDURE — 84207 ASSAY OF VITAMIN B-6: CPT

## 2024-12-05 PROCEDURE — 81001 URINALYSIS AUTO W/SCOPE: CPT

## 2024-12-05 PROCEDURE — 80061 LIPID PANEL: CPT

## 2024-12-05 PROCEDURE — 80050 GENERAL HEALTH PANEL: CPT

## 2024-12-06 LAB
25(OH)D3 SERPL-MCNC: 10 NG/ML (ref 30–100)
BACTERIA SPEC AEROBE CULT: NO GROWTH
VIT B12 BLD-MCNC: 513 PG/ML (ref 211–946)

## 2024-12-06 RX ORDER — ERGOCALCIFEROL 1.25 MG/1
50000 CAPSULE, LIQUID FILLED ORAL WEEKLY
Qty: 12 CAPSULE | Refills: 0 | Status: SHIPPED | OUTPATIENT
Start: 2024-12-06 | End: 2025-03-03

## 2024-12-10 LAB — PYRIDOXAL PHOS SERPL-MCNC: 37.6 UG/L (ref 3.4–65.2)

## 2025-01-15 ENCOUNTER — OFFICE VISIT (OUTPATIENT)
Dept: OBSTETRICS AND GYNECOLOGY | Age: 35
End: 2025-01-15
Payer: COMMERCIAL

## 2025-01-15 VITALS
DIASTOLIC BLOOD PRESSURE: 76 MMHG | WEIGHT: 143 LBS | HEIGHT: 61 IN | SYSTOLIC BLOOD PRESSURE: 120 MMHG | BODY MASS INDEX: 27 KG/M2

## 2025-01-15 DIAGNOSIS — Z11.3 SCREENING FOR STD (SEXUALLY TRANSMITTED DISEASE): Primary | ICD-10-CM

## 2025-01-15 DIAGNOSIS — N76.1 SUBACUTE VAGINITIS: ICD-10-CM

## 2025-01-15 PROCEDURE — 99213 OFFICE O/P EST LOW 20 MIN: CPT | Performed by: NURSE PRACTITIONER

## 2025-01-15 NOTE — PATIENT INSTRUCTIONS

## 2025-01-15 NOTE — PROGRESS NOTES
"Chief Complaint  Gynecologic Exam (Pt is here wanting screening done.  Pt c/o having a slight odor recently )  History of Present Illness  The patient presents for evaluation of vaginal discharge.    She reports a persistent, mild odor in her vaginal discharge over the past few weeks, which she describes as yellow in color. She typically uses panty liners on most days due to the presence of discharge, even when it is not colored. She ensures regular changes of these liners. She does not experience any pain during intercourse or sensations of pelvic pressure or fullness.    Subjective          Kimberly Nassar presents to [facility]  History of Present Illness    Review of Systems   Genitourinary:  Positive for vaginal discharge (with odor).         Objective   Vital Signs:   /76   Ht 154.9 cm (61\")   Wt 64.9 kg (143 lb)   BMI 27.02 kg/m²     Physical Exam  Physical Exam  There is a little bit of irritation within the vagina and some mildly yellow discharge.     Result Review :   The following data was reviewed by: BHARATH Angulo on 01/15/2025:  OBGYN Diagnostics Review:   Lab Results   Component Value Date    CASEREPORT  06/13/2023     Gynecologic Cytology Report                       Case: [id number]                                  Authorizing Provider:  Keke Bailey APRN Collected:           06/13/2023 10:53 AM          Ordering Location:     [facility]     Received:            06/14/2023 06:52 AM                                 [facility]                                                                  First Screen:          Judy Agustin                                                           Specimen:    Liquid-Based Pap, Screening, Cervix                                                        INTERPGYN Negative for intraepithelial lesion or malignancy 06/13/2023    GENCAT Within normal limits 06/13/2023    SPECADGYN  06/13/2023     " Satisfactory for evaluation, endocervical/transformation zone component present    ADDINFO  06/13/2023     Disclaimer: Cervical cytology is a screening test primarily for squamous cancer and its precursors and has associated false-negative and false-positive results.  Technologies such as liquid-based preparations may decrease but will not eliminate all false-negative results.  Follow-up of unexplained clinical signs and symptoms is recommended to minimize false-negative results. (The [place] [place] System for Reporting Cervical Cytology: [employer], 2015).        HPV Aptima   Date Value Ref Range Status   06/13/2023 Not Detected Not Detected Final             Current Outpatient Medications on File Prior to Visit   Medication Sig    cloNIDine (CATAPRES) 0.1 MG tablet Take 1 tablet by mouth At Night As Needed (insomnia).    diclofenac (VOLTAREN) 75 MG EC tablet Take 1 tablet by mouth 2 (Two) Times a Day. (Patient taking differently: Take 1 tablet by mouth 2 (Two) Times a Day. PRN)    Levonorgestrel (MIRENA) 20 MCG/DAY intrauterine device IUD 1 each by Intrauterine route 1 (One) Time for 1 dose.    methocarbamol (ROBAXIN) 750 MG tablet Take 1 tablet by mouth At Night As Needed for Muscle Spasms.    spironolactone (ALDACTONE) 50 MG tablet take 1 tablet once daily for 2 weeks, then switch to 1 tablet twice daily if tolerated (Patient taking differently: 2 tablets Daily.)    tretinoin (RETIN-A) 0.025 % cream apply a pea sized amount to the face nightly with a moisturizer as tolerated for acne.    vitamin D (ERGOCALCIFEROL) 1.25 MG (68060 UT) capsule capsule Take 1 capsule by mouth 1 (One) Time Per Week for 12 doses. Begin vitamin D 5000 IU over-the-counter daily after completion of this prescription.     No current facility-administered medications on file prior to visit.          Assessment and Plan        Assessment & Plan  1.  Vaginitis  Discussed patient's symptoms.  Specimen collected for Mycoplasma, BV panel and  Chlamydia/Gonorrhea/Trichomonas testing.     Diagnoses and all orders for this visit:    1. Screening for STD (sexually transmitted disease) (Primary)    2. Subacute vaginitis  -     Genital Mycoplasmas BREANA, Swab - Swab, Vagina  -     NuSwab VG+ - Swab, Vagina                  Follow Up   Return for Annual physical.    Patient was given instructions and counseling regarding her condition or for health maintenance advice. Please see specific information pulled into the AVS if appropriate.       Patient or patient representative verbalized consent for the use of Ambient Listening during the visit with  BHARATH Angulo for chart documentation. 1/27/2025  20:30 CST

## 2025-01-20 LAB
A VAGINAE DNA VAG QL NAA+PROBE: ABNORMAL SCORE
BVAB2 DNA VAG QL NAA+PROBE: ABNORMAL SCORE
C ALBICANS DNA VAG QL NAA+PROBE: NEGATIVE
C GLABRATA DNA VAG QL NAA+PROBE: NEGATIVE
C TRACH DNA SPEC QL NAA+PROBE: NEGATIVE
M GENITALIUM DNA SPEC QL NAA+PROBE: NEGATIVE
M HOMINIS DNA SPEC QL NAA+PROBE: POSITIVE
MEGA1 DNA VAG QL NAA+PROBE: ABNORMAL SCORE
N GONORRHOEA DNA VAG QL NAA+PROBE: NEGATIVE
T VAGINALIS DNA VAG QL NAA+PROBE: NEGATIVE
UREAPLASMA DNA SPEC QL NAA+PROBE: POSITIVE

## 2025-01-21 RX ORDER — DOXYCYCLINE 100 MG/1
100 CAPSULE ORAL 2 TIMES DAILY
Qty: 14 CAPSULE | Refills: 0 | Status: SHIPPED | OUTPATIENT
Start: 2025-01-21

## 2025-01-21 RX ORDER — METRONIDAZOLE 7.5 MG/G
1 GEL VAGINAL NIGHTLY
Qty: 70 G | Refills: 0 | Status: SHIPPED | OUTPATIENT
Start: 2025-01-21 | End: 2025-01-27

## 2025-01-27 RX ORDER — FLUCONAZOLE 150 MG/1
150 TABLET ORAL EVERY OTHER DAY
Qty: 2 TABLET | Refills: 0 | Status: SHIPPED | OUTPATIENT
Start: 2025-01-27 | End: 2025-01-31

## 2025-02-24 DIAGNOSIS — L70.0 ACNE VULGARIS: ICD-10-CM

## 2025-02-24 RX ORDER — SPIRONOLACTONE 50 MG/1
TABLET, FILM COATED ORAL
Qty: 60 TABLET | Refills: 3 | Status: SHIPPED | OUTPATIENT
Start: 2025-02-24 | End: 2025-04-02

## 2025-03-12 ENCOUNTER — OFFICE VISIT (OUTPATIENT)
Dept: FAMILY MEDICINE CLINIC | Facility: CLINIC | Age: 35
End: 2025-03-12
Payer: COMMERCIAL

## 2025-03-12 VITALS
WEIGHT: 146 LBS | DIASTOLIC BLOOD PRESSURE: 70 MMHG | BODY MASS INDEX: 27.56 KG/M2 | TEMPERATURE: 97.1 F | HEART RATE: 78 BPM | RESPIRATION RATE: 20 BRPM | OXYGEN SATURATION: 98 % | HEIGHT: 61 IN | SYSTOLIC BLOOD PRESSURE: 120 MMHG

## 2025-03-12 DIAGNOSIS — L70.0 CYSTIC ACNE: ICD-10-CM

## 2025-03-12 DIAGNOSIS — E55.9 VITAMIN D DEFICIENCY: Primary | ICD-10-CM

## 2025-03-12 DIAGNOSIS — G47.00 INSOMNIA, UNSPECIFIED TYPE: ICD-10-CM

## 2025-03-12 DIAGNOSIS — D64.9 ANEMIA, UNSPECIFIED TYPE: ICD-10-CM

## 2025-03-12 DIAGNOSIS — L70.0 ACNE VULGARIS: ICD-10-CM

## 2025-03-12 PROCEDURE — 99214 OFFICE O/P EST MOD 30 MIN: CPT | Performed by: NURSE PRACTITIONER

## 2025-03-12 RX ORDER — SPIRONOLACTONE 100 MG/1
100 TABLET, FILM COATED ORAL DAILY
Qty: 90 TABLET | Refills: 4 | Status: SHIPPED | OUTPATIENT
Start: 2025-03-12

## 2025-03-12 RX ORDER — CLONIDINE HYDROCHLORIDE 0.1 MG/1
0.1 TABLET ORAL NIGHTLY PRN
Qty: 90 TABLET | Refills: 4 | Status: SHIPPED | OUTPATIENT
Start: 2025-03-12
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
## 2025-03-13 ENCOUNTER — LAB (OUTPATIENT)
Dept: LAB | Facility: HOSPITAL | Age: 35
End: 2025-03-13
Payer: COMMERCIAL

## 2025-03-13 DIAGNOSIS — E55.9 VITAMIN D DEFICIENCY: ICD-10-CM

## 2025-03-13 PROCEDURE — 36415 COLL VENOUS BLD VENIPUNCTURE: CPT

## 2025-03-13 PROCEDURE — 82306 VITAMIN D 25 HYDROXY: CPT

## 2025-03-14 LAB — 25(OH)D3 SERPL-MCNC: 26.8 NG/ML (ref 30–100)

## 2025-05-02 ENCOUNTER — OFFICE VISIT (OUTPATIENT)
Dept: OBSTETRICS AND GYNECOLOGY | Age: 35
End: 2025-05-02
Payer: COMMERCIAL

## 2025-05-02 VITALS
SYSTOLIC BLOOD PRESSURE: 118 MMHG | DIASTOLIC BLOOD PRESSURE: 78 MMHG | HEIGHT: 61 IN | WEIGHT: 148 LBS | BODY MASS INDEX: 27.94 KG/M2

## 2025-05-02 DIAGNOSIS — N89.8 VAGINAL DISCHARGE: ICD-10-CM

## 2025-05-02 DIAGNOSIS — N76.0 VAGINAL INFECTION: ICD-10-CM

## 2025-05-02 DIAGNOSIS — Z11.3 SCREEN FOR STD (SEXUALLY TRANSMITTED DISEASE): ICD-10-CM

## 2025-05-02 DIAGNOSIS — N89.8 VAGINAL IRRITATION: Primary | ICD-10-CM

## 2025-05-02 RX ORDER — METRONIDAZOLE 500 MG/1
500 TABLET ORAL 2 TIMES DAILY
Qty: 14 TABLET | Refills: 0 | Status: SHIPPED | OUTPATIENT
Start: 2025-05-02

## 2025-05-02 RX ORDER — FLUCONAZOLE 150 MG/1
TABLET ORAL
Qty: 2 TABLET | Refills: 0 | Status: SHIPPED | OUTPATIENT
Start: 2025-05-02

## 2025-05-02 NOTE — PROGRESS NOTES
Chief Complaint   Patient presents with    Vaginitis     Patient is here for evaluation of vaginal irritation since Sunday. Denies new soaps/products or sexual partners. Did find out partner had been unfaithful and is concerned for STD also.       History:  Kimberly Nassar is a 34 y.o. female who presents today for follow-up for evaluation of the above:  Pt comes in today for possible vaginal infection. Has noticed some irritation for a few days. Also requests STD testing.           ROS:  Review of Systems   Constitutional:  Negative for activity change and unexpected weight loss.   HENT:  Negative for congestion.    Cardiovascular:  Negative for chest pain.   Gastrointestinal:  Negative for blood in stool, constipation and diarrhea.   Endocrine: Negative for cold intolerance and heat intolerance.   Genitourinary:  Negative for dyspareunia, pelvic pain and vaginal discharge.        Vaginal irritation   Musculoskeletal:  Negative for arthralgias, back pain, neck pain and neck stiffness.   Skin:  Negative for rash.   Neurological:  Negative for dizziness and headache.   Psychiatric/Behavioral:  Negative for sleep disturbance. The patient is not nervous/anxious.        Ms. Nassar  reports that she has never smoked. She has never used smokeless tobacco. She reports current alcohol use. She reports that she does not use drugs.      Current Outpatient Medications:     cloNIDine (CATAPRES) 0.1 MG tablet, Take 1 tablet by mouth At Night As Needed (insomnia)., Disp: 90 tablet, Rfl: 4    diclofenac (VOLTAREN) 75 MG EC tablet, Take 1 tablet by mouth 2 (Two) Times a Day. (Patient taking differently: Take 1 tablet by mouth 2 (Two) Times a Day. PRN), Disp: 30 tablet, Rfl: 0    methocarbamol (ROBAXIN) 750 MG tablet, Take 1 tablet by mouth At Night As Needed for Muscle Spasms., Disp: 20 tablet, Rfl: 0    spironolactone (Aldactone) 100 MG tablet, Take 1 tablet by mouth Daily., Disp: 90 tablet, Rfl: 4    tretinoin (RETIN-A) 0.025 %  "cream, apply a pea sized amount to the face nightly with a moisturizer as tolerated for acne., Disp: 45 g, Rfl: 3    tretinoin (RETIN-A) 0.025 % cream, Apply a pea-sized amount to the face once nightly as tolerated under a moisturizer., Disp: 45 g, Rfl: 3    fluconazole (Diflucan) 150 MG tablet, Take 1 tablet by mouth now, and repeat 1 week, Disp: 2 tablet, Rfl: 0    Levonorgestrel (MIRENA) 20 MCG/DAY intrauterine device IUD, 1 each by Intrauterine route 1 (One) Time for 1 dose., Disp: 1 each, Rfl: 0    metroNIDAZOLE (Flagyl) 500 MG tablet, Take 1 tablet by mouth 2 (Two) Times a Day for 7 days---AVOID all forms of alcohol while taking this medication and for 72 hours after, Disp: 14 tablet, Rfl: 0      OBJECTIVE:  /78   Ht 154.9 cm (61\")   Wt 67.1 kg (148 lb)   LMP 04/08/2025 (Approximate)   BMI 27.96 kg/m²    Physical Exam  Vitals and nursing note reviewed. Exam conducted with a chaperone present.   Constitutional:       Appearance: She is well-developed.   HENT:      Head: Normocephalic and atraumatic.   Cardiovascular:      Rate and Rhythm: Normal rate and regular rhythm.   Abdominal:      General: Bowel sounds are normal. There is no distension.      Palpations: Abdomen is soft.      Tenderness: There is no abdominal tenderness.      Hernia: There is no hernia in the left inguinal area.   Genitourinary:     Labia:         Right: No rash, tenderness or lesion.         Left: No rash, tenderness or lesion.       Vagina: Vaginal discharge, erythema and tenderness present.      Cervix: Discharge present. No cervical motion tenderness or friability.      Adnexa:         Right: No tenderness.          Left: No tenderness.     Skin:     General: Skin is warm and dry.   Neurological:      Mental Status: She is alert and oriented to person, place, and time.   Psychiatric:         Behavior: Behavior normal.         Thought Content: Thought content normal.         Judgment: Judgment normal. "         Assessment/Plan    Diagnoses and all orders for this visit:    1. Vaginal irritation (Primary)  -     NuSwab VG+ - Swab, Cervix  -     Genital Mycoplasmas BREANA, Swab - Swab, Cervix    2. Screen for STD (sexually transmitted disease)  -     NuSwab VG+ - Swab, Cervix    3. Vaginal discharge  -     NuSwab VG+ - Swab, Cervix  -     Genital Mycoplasmas BREANA, Swab - Swab, Cervix    4. Vaginal infection  -     metroNIDAZOLE (Flagyl) 500 MG tablet; Take 1 tablet by mouth 2 (Two) Times a Day for 7 days---AVOID all forms of alcohol while taking this medication and for 72 hours after  Dispense: 14 tablet; Refill: 0  -     fluconazole (Diflucan) 150 MG tablet; Take 1 tablet by mouth now, and repeat 1 week  Dispense: 2 tablet; Refill: 0    Pt wishes to go ahead and be covered as above with flagyl and diflucan since it is the weekend.  Will call with results and any further recommendations.        An After Visit Summary was printed and given to the patient at discharge.  Return for Next scheduled follow up. Sooner if problems arise.          BHARATH Briseno  Electronically Signed

## 2025-05-05 LAB
A VAGINAE DNA VAG QL NAA+PROBE: NORMAL SCORE
BVAB2 DNA VAG QL NAA+PROBE: NORMAL SCORE
C ALBICANS DNA VAG QL NAA+PROBE: NEGATIVE
C GLABRATA DNA VAG QL NAA+PROBE: NEGATIVE
C TRACH DNA SPEC QL NAA+PROBE: NEGATIVE
M GENITALIUM DNA SPEC QL NAA+PROBE: NEGATIVE
M HOMINIS DNA SPEC QL NAA+PROBE: NEGATIVE
MEGA1 DNA VAG QL NAA+PROBE: NORMAL SCORE
N GONORRHOEA DNA VAG QL NAA+PROBE: NEGATIVE
T VAGINALIS DNA VAG QL NAA+PROBE: NEGATIVE
UREAPLASMA DNA SPEC QL NAA+PROBE: POSITIVE

## 2025-05-07 ENCOUNTER — RESULTS FOLLOW-UP (OUTPATIENT)
Dept: OBSTETRICS AND GYNECOLOGY | Age: 35
End: 2025-05-07
Payer: COMMERCIAL

## 2025-05-07 DIAGNOSIS — N76.0 VAGINAL INFECTION: Primary | ICD-10-CM

## 2025-05-07 RX ORDER — DOXYCYCLINE 100 MG/1
100 CAPSULE ORAL EVERY 12 HOURS SCHEDULED
Qty: 20 CAPSULE | Refills: 0 | Status: SHIPPED | OUTPATIENT
Start: 2025-05-07 | End: 2025-05-18

## 2025-05-09 NOTE — TELEPHONE ENCOUNTER
Pt returned phone call and informed of ureaplasma positive on vaginal culture and abx sent to pharmacy. Pt reports previously treated for ureaplasma and is asking for GRETA to be sure this bacteria is resolved. Pt advised to return to office 6-8 weeks after completion of abx therapy for GRETA and scheduled for 7/1 with BHARATH Davies per pt request. Pt voices understanding to conversation.

## 2025-05-21 ENCOUNTER — TELEPHONE (OUTPATIENT)
Dept: OBSTETRICS AND GYNECOLOGY | Age: 35
End: 2025-05-21
Payer: COMMERCIAL

## 2025-05-21 DIAGNOSIS — N89.8 VAGINAL IRRITATION: Primary | ICD-10-CM

## 2025-05-21 DIAGNOSIS — N76.0 VAGINAL INFECTION: ICD-10-CM

## 2025-05-21 RX ORDER — FLUCONAZOLE 150 MG/1
150 TABLET ORAL EVERY OTHER DAY
Qty: 3 TABLET | Refills: 0 | Status: SHIPPED | OUTPATIENT
Start: 2025-05-21

## 2025-05-21 RX ORDER — FLUCONAZOLE 150 MG/1
TABLET ORAL
Qty: 2 TABLET | Refills: 0 | OUTPATIENT
Start: 2025-05-21

## 2025-05-21 NOTE — TELEPHONE ENCOUNTER
Pt calling to advise recent antibiotics we prescribed for BV have given her yeast infection, asking if Diflucan can be sent in for treatment.

## 2025-06-11 ENCOUNTER — TELEPHONE (OUTPATIENT)
Dept: FAMILY MEDICINE CLINIC | Facility: CLINIC | Age: 35
End: 2025-06-11

## 2025-06-11 NOTE — TELEPHONE ENCOUNTER
L/M for patient concerning No Show appointment this date 06- with Ana Owen @10:15 AM for an office visit explained office policy No Show and letter sent.  OK for HUB to schedule if patient calls back

## 2025-06-18 ENCOUNTER — OFFICE VISIT (OUTPATIENT)
Age: 35
End: 2025-06-18
Payer: COMMERCIAL

## 2025-06-18 VITALS
SYSTOLIC BLOOD PRESSURE: 104 MMHG | HEIGHT: 61 IN | DIASTOLIC BLOOD PRESSURE: 68 MMHG | BODY MASS INDEX: 27.19 KG/M2 | WEIGHT: 144 LBS

## 2025-06-18 DIAGNOSIS — Z97.5 IUD (INTRAUTERINE DEVICE) IN PLACE: ICD-10-CM

## 2025-06-18 DIAGNOSIS — N76.1 SUBACUTE VAGINITIS: ICD-10-CM

## 2025-06-18 DIAGNOSIS — Z01.419 ENCOUNTER FOR GYNECOLOGICAL EXAMINATION WITHOUT ABNORMAL FINDING: Primary | ICD-10-CM

## 2025-06-18 DIAGNOSIS — Z12.31 SCREENING MAMMOGRAM, ENCOUNTER FOR: ICD-10-CM

## 2025-06-18 RX ORDER — DIPHENOXYLATE HYDROCHLORIDE AND ATROPINE SULFATE 2.5; .025 MG/1; MG/1
TABLET ORAL DAILY
COMMUNITY

## 2025-06-18 NOTE — PROGRESS NOTES
"Chief Complaint  Annual Exam (Pt is here for an annual exam as well as retest due to Ureaplasma+ on 5/2/25 /Last pap 6/13/23 /Last mammogram 11/8/24 Negative /PT has no complaints today )  History of Present Illness  The patient presents for evaluation of vaginal bleeding.    She reports no current health issues, including constipation, diarrhea, urinary incontinence, or leakage. Recent vaginal infection. No significant weight fluctuations or dietary changes are noted. Physical activity includes walking, but she has not resumed her gym routine. Currently using Mirena for contraception, she notes minimal menstrual bleeding this month, lasting only 2 to 3 days. She recalls a gradual decrease in bleeding pattern since the insertion of the Mirena.    GYNECOLOGICAL HISTORY:  - Gynecology History discussed    CONTRACEPTION:  - Mirena IUD, minimal bleeding noted this month, lasting 2 to 3 days  Subjective          Jorge Aturony Nassar presents to [facility]  History of Present Illness    Review of Systems   Constitutional:  Negative for activity change, appetite change, fatigue and fever.   HENT:  Negative for congestion, sore throat and trouble swallowing.    Eyes:  Negative for pain, discharge and visual disturbance.   Respiratory:  Negative for apnea, shortness of breath and wheezing.    Cardiovascular:  Negative for chest pain, palpitations and leg swelling.   Gastrointestinal:  Negative for abdominal pain, constipation and diarrhea.   Genitourinary:  Negative for frequency, pelvic pain, urgency and vaginal discharge.        Mirena  Recent vaginitis     Musculoskeletal:  Negative for back pain and gait problem.   Skin:  Negative for color change and rash.   Neurological:  Negative for dizziness, weakness and numbness.   Psychiatric/Behavioral:  Negative for confusion and sleep disturbance.         Objective   Vital Signs:   /68   Ht 154.9 cm (61\")   Wt 65.3 kg (144 lb)   BMI 27.21 kg/m²   "   Physical Exam  Vitals and nursing note reviewed. Exam conducted with a chaperone present.   Constitutional:       General: She is not in acute distress.     Appearance: She is well-developed. She is not diaphoretic.   HENT:      Head: Normocephalic.      Right Ear: External ear normal.      Left Ear: External ear normal.      Nose: Nose normal.   Eyes:      General: No scleral icterus.        Right eye: No discharge.         Left eye: No discharge.      Conjunctiva/sclera: Conjunctivae normal.      Pupils: Pupils are equal, round, and reactive to light.   Neck:      Thyroid: No thyromegaly.      Vascular: No carotid bruit.      Trachea: No tracheal deviation.   Cardiovascular:      Rate and Rhythm: Normal rate and regular rhythm.      Heart sounds: Normal heart sounds. No murmur heard.  Pulmonary:      Effort: Pulmonary effort is normal. No respiratory distress.      Breath sounds: Normal breath sounds. No wheezing.   Chest:   Breasts:     Breasts are symmetrical.      Right: Normal. No swelling, bleeding, inverted nipple, mass, nipple discharge, skin change or tenderness.      Left: Normal. No swelling, bleeding, inverted nipple, mass, nipple discharge, skin change or tenderness.   Abdominal:      General: There is no distension.      Palpations: Abdomen is soft. There is no mass.      Tenderness: There is no abdominal tenderness. There is no right CVA tenderness, left CVA tenderness or guarding.      Hernia: No hernia is present. There is no hernia in the left inguinal area or right inguinal area.   Genitourinary:     General: Normal vulva.      Exam position: Lithotomy position.      Labia:         Right: No rash, tenderness, lesion or injury.         Left: No rash, tenderness, lesion or injury.       Vagina: Normal. No signs of injury and foreign body. No vaginal discharge, erythema, tenderness or bleeding.      Cervix: Normal.      Uterus: Normal. Not enlarged, not fixed and not tender.       Adnexa: Right  adnexa normal and left adnexa normal.        Right: No mass, tenderness or fullness.          Left: No mass, tenderness or fullness.        Rectum: Normal. No mass.      Comments:   BSU normal  Urethral meatus  Normal  Perineum  Normal  Musculoskeletal:         General: No tenderness. Normal range of motion.      Cervical back: Normal range of motion and neck supple.   Lymphadenopathy:      Head:      Right side of head: No submental, submandibular, tonsillar, preauricular, posterior auricular or occipital adenopathy.      Left side of head: No submental, submandibular, tonsillar, preauricular, posterior auricular or occipital adenopathy.      Cervical: No cervical adenopathy.      Right cervical: No superficial, deep or posterior cervical adenopathy.     Left cervical: No superficial, deep or posterior cervical adenopathy.      Upper Body:      Right upper body: No supraclavicular, axillary or pectoral adenopathy.      Left upper body: No supraclavicular, axillary or pectoral adenopathy.      Lower Body: No right inguinal adenopathy. No left inguinal adenopathy.   Skin:     General: Skin is warm and dry.      Findings: No bruising, erythema or rash.   Neurological:      Mental Status: She is alert and oriented to person, place, and time.      Coordination: Coordination normal.   Psychiatric:         Mood and Affect: Mood normal.         Behavior: Behavior normal.         Thought Content: Thought content normal.         Judgment: Judgment normal.       Result Review :                Current Outpatient Medications on File Prior to Visit   Medication Sig    cloNIDine (CATAPRES) 0.1 MG tablet Take 1 tablet by mouth At Night As Needed (insomnia).    diclofenac (VOLTAREN) 75 MG EC tablet Take 1 tablet by mouth 2 (Two) Times a Day. (Patient taking differently: Take 1 tablet by mouth 2 (Two) Times a Day. PRN)    Levonorgestrel (MIRENA) 20 MCG/DAY intrauterine device IUD 1 each by Intrauterine route 1 (One) Time for 1 dose.     methocarbamol (ROBAXIN) 750 MG tablet Take 1 tablet by mouth At Night As Needed for Muscle Spasms.    multivitamin (MULTI-DAY PO) Take  by mouth Daily.    Probiotic Product (PROBIOTIC DAILY PO) Take  by mouth.    spironolactone (Aldactone) 100 MG tablet Take 1 tablet by mouth Daily.    tretinoin (RETIN-A) 0.025 % cream apply a pea sized amount to the face nightly with a moisturizer as tolerated for acne.    tretinoin (RETIN-A) 0.025 % cream Apply a pea-sized amount to the face once nightly as tolerated under a moisturizer.    fluconazole (Diflucan) 150 MG tablet Take 1 tablet by mouth Every Other Day for a total of 3 doses (Patient not taking: Reported on 6/18/2025)    metroNIDAZOLE (Flagyl) 500 MG tablet Take 1 tablet by mouth 2 (Two) Times a Day for 7 days---AVOID all forms of alcohol while taking this medication and for 72 hours after (Patient not taking: Reported on 6/18/2025)     No current facility-administered medications on file prior to visit.          Assessment and Plan      Well woman GYN exam.   Pap smear not indicated per ASCCP guidelines.   Will have lab work at     [facility] STD prevention and testing.   Chlamydia/Gonorrhea/Trichomonas ordered.   RPR, Hep panel and HIV declined.     Mammogram will be scheduled at [facility].     Assessment & Plan  1. Vaginal bleeding.  Her bleeding pattern has been gradually decreasing since the insertion of the Mirena. It is noted that some individuals may not experience any bleeding with an IUD. Report any instances of pain or excessive bleeding.     2. Vaginitis.   Specimen collected for Mycoplasma, BV panel and Chlamydia/Gonorrhea/Trichomonas testing.       Diagnoses and all orders for this visit:    1. Encounter for gynecological examination without abnormal finding (Primary)    2. Subacute vaginitis  -     Genital Mycoplasmas BREANA, Swab - Swab, Vagina  -     NuSwab VG+ - Swab, Vagina    3. Screening mammogram, encounter for  -     Mammo Screening Digital  Tomosynthesis Bilateral With CAD; Future    4. IUD (intrauterine device) in place          BMI is >= 25 and <30. (Overweight) The following options were offered after discussion;: information on healthy weight added to patient's after visit summary        Follow Up   Return for Annual physical.    Patient was given instructions and counseling regarding her condition or for health maintenance advice. Please see specific information pulled into the AVS if appropriate.     Patient or patient representative verbalized consent for the use of Ambient Listening during the visit with  BHARATH Angulo for chart documentation. 6/29/2025  22:20 CDT

## 2025-06-18 NOTE — PATIENT INSTRUCTIONS

## 2025-06-30 LAB
A VAGINAE DNA VAG QL NAA+PROBE: ABNORMAL SCORE
BVAB2 DNA VAG QL NAA+PROBE: ABNORMAL SCORE
C ALBICANS DNA VAG QL NAA+PROBE: NEGATIVE
C GLABRATA DNA VAG QL NAA+PROBE: NEGATIVE
C TRACH DNA SPEC QL NAA+PROBE: NEGATIVE
M GENITALIUM DNA SPEC QL NAA+PROBE: NEGATIVE
M HOMINIS DNA SPEC QL NAA+PROBE: NEGATIVE
MEGA1 DNA VAG QL NAA+PROBE: ABNORMAL SCORE
N GONORRHOEA DNA VAG QL NAA+PROBE: NEGATIVE
T VAGINALIS DNA VAG QL NAA+PROBE: NEGATIVE
UREAPLASMA DNA SPEC QL NAA+PROBE: POSITIVE

## 2025-07-07 RX ORDER — FLUCONAZOLE 150 MG/1
150 TABLET ORAL WEEKLY
Qty: 4 TABLET | Refills: 0 | Status: SHIPPED | OUTPATIENT
Start: 2025-07-07